# Patient Record
Sex: FEMALE | Race: WHITE | NOT HISPANIC OR LATINO | Employment: FULL TIME | URBAN - METROPOLITAN AREA
[De-identification: names, ages, dates, MRNs, and addresses within clinical notes are randomized per-mention and may not be internally consistent; named-entity substitution may affect disease eponyms.]

---

## 2018-11-28 ENCOUNTER — OCCUPATIONAL HEALTH (OUTPATIENT)
Dept: URGENT CARE | Facility: CLINIC | Age: 48
End: 2018-11-28

## 2018-11-28 PROCEDURE — 71045 X-RAY EXAM CHEST 1 VIEW: CPT | Mod: S$GLB,,, | Performed by: EMERGENCY MEDICINE

## 2018-11-28 PROCEDURE — 80305 DRUG TEST PRSMV DIR OPT OBS: CPT | Mod: S$GLB,,, | Performed by: EMERGENCY MEDICINE

## 2019-03-06 DIAGNOSIS — N60.19: Primary | ICD-10-CM

## 2021-04-27 ENCOUNTER — CLINICAL SUPPORT (OUTPATIENT)
Dept: OTHER | Facility: CLINIC | Age: 51
End: 2021-04-27

## 2021-04-27 DIAGNOSIS — Z00.8 ENCOUNTER FOR OTHER GENERAL EXAMINATION: ICD-10-CM

## 2021-04-28 VITALS — BODY MASS INDEX: 17.85 KG/M2 | HEIGHT: 67 IN

## 2021-04-28 LAB
GLUCOSE SERPL-MCNC: 95 MG/DL (ref 60–140)
HDLC SERPL-MCNC: 101 MG/DL
POC CHOLESTEROL, TOTAL: 181 MG/DL
TRIGL SERPL-MCNC: 80 MG/DL

## 2022-05-31 ENCOUNTER — TELEPHONE (OUTPATIENT)
Dept: NEUROLOGY | Facility: CLINIC | Age: 52
End: 2022-05-31
Payer: COMMERCIAL

## 2022-05-31 NOTE — TELEPHONE ENCOUNTER
----- Message from Ramya Colindres sent at 5/31/2022  8:17 AM CDT -----  Contact: IVONNE BRADFORD [[id number]] [phone]  Type: Appointment Request    Name of Caller: IVONNE BRADFORD [[id number]]  When is the first available appointment? Do not have access  Reason for Visit:  Chronic migraine headaches  Best Call Back Number: [phone]  Additional Information:  New patient, has [employer] insurance.

## 2022-06-09 ENCOUNTER — TELEPHONE (OUTPATIENT)
Dept: NEUROLOGY | Facility: CLINIC | Age: 52
End: 2022-06-09
Payer: COMMERCIAL

## 2022-06-09 NOTE — TELEPHONE ENCOUNTER
Received message about scheduling patient. Attempted to contact, no answer, left voice mail. Will also send message in My Chart.

## 2022-06-10 ENCOUNTER — TELEPHONE (OUTPATIENT)
Dept: NEUROLOGY | Facility: CLINIC | Age: 52
End: 2022-06-10
Payer: COMMERCIAL

## 2022-06-10 NOTE — TELEPHONE ENCOUNTER
Called patient and scheduled new patient appointment. Date/Time/Location confirmed. Verbalized understanding. Also placed on wait list.

## 2022-06-10 NOTE — TELEPHONE ENCOUNTER
----- Message from Germán Camacho sent at 6/10/2022  8:36 AM CDT -----  Regarding: ret call  Type:  Patient Returning Call    Who Called:  Jan    Who Left Message for Patient:  Gabriela    Does the patient know what this is regarding?:  yes    Best Call Back Number:  [phone]      Additional Information:

## 2022-06-22 ENCOUNTER — TELEPHONE (OUTPATIENT)
Dept: NEUROLOGY | Facility: CLINIC | Age: 52
End: 2022-06-22
Payer: COMMERCIAL

## 2022-06-22 NOTE — TELEPHONE ENCOUNTER
Called patient and offered new patient appointment for tomorrow. Patient stated that she had to check with co workers and requested that I call her back.       Called patient back and she will take appointment for tomorrow. Date/Time/Location confirmed. Verbalized understanding.

## 2022-06-23 ENCOUNTER — OFFICE VISIT (OUTPATIENT)
Dept: NEUROLOGY | Facility: CLINIC | Age: 52
End: 2022-06-23
Payer: COMMERCIAL

## 2022-06-23 VITALS
RESPIRATION RATE: 17 BRPM | BODY MASS INDEX: 17.81 KG/M2 | SYSTOLIC BLOOD PRESSURE: 143 MMHG | DIASTOLIC BLOOD PRESSURE: 85 MMHG | WEIGHT: 113.44 LBS | HEART RATE: 86 BPM | HEIGHT: 67 IN

## 2022-06-23 DIAGNOSIS — E03.9 ADULT HYPOTHYROIDISM: ICD-10-CM

## 2022-06-23 DIAGNOSIS — G43.119 INTRACTABLE MIGRAINE WITH AURA WITHOUT STATUS MIGRAINOSUS: Primary | ICD-10-CM

## 2022-06-23 DIAGNOSIS — R51.9 WORSENING HEADACHES: ICD-10-CM

## 2022-06-23 PROCEDURE — 3077F SYST BP >= 140 MM HG: CPT | Mod: CPTII,S$GLB,, | Performed by: PSYCHIATRY & NEUROLOGY

## 2022-06-23 PROCEDURE — 1160F RVW MEDS BY RX/DR IN RCRD: CPT | Mod: CPTII,S$GLB,, | Performed by: PSYCHIATRY & NEUROLOGY

## 2022-06-23 PROCEDURE — 1159F PR MEDICATION LIST DOCUMENTED IN MEDICAL RECORD: ICD-10-PCS | Mod: CPTII,S$GLB,, | Performed by: PSYCHIATRY & NEUROLOGY

## 2022-06-23 PROCEDURE — 1160F PR REVIEW ALL MEDS BY PRESCRIBER/CLIN PHARMACIST DOCUMENTED: ICD-10-PCS | Mod: CPTII,S$GLB,, | Performed by: PSYCHIATRY & NEUROLOGY

## 2022-06-23 PROCEDURE — 3008F BODY MASS INDEX DOCD: CPT | Mod: CPTII,S$GLB,, | Performed by: PSYCHIATRY & NEUROLOGY

## 2022-06-23 PROCEDURE — 99205 OFFICE O/P NEW HI 60 MIN: CPT | Mod: S$GLB,,, | Performed by: PSYCHIATRY & NEUROLOGY

## 2022-06-23 PROCEDURE — 1159F MED LIST DOCD IN RCRD: CPT | Mod: CPTII,S$GLB,, | Performed by: PSYCHIATRY & NEUROLOGY

## 2022-06-23 PROCEDURE — 3008F PR BODY MASS INDEX (BMI) DOCUMENTED: ICD-10-PCS | Mod: CPTII,S$GLB,, | Performed by: PSYCHIATRY & NEUROLOGY

## 2022-06-23 PROCEDURE — 3079F PR MOST RECENT DIASTOLIC BLOOD PRESSURE 80-89 MM HG: ICD-10-PCS | Mod: CPTII,S$GLB,, | Performed by: PSYCHIATRY & NEUROLOGY

## 2022-06-23 PROCEDURE — 99999 PR PBB SHADOW E&M-EST. PATIENT-LVL IV: CPT | Mod: PBBFAC,,, | Performed by: PSYCHIATRY & NEUROLOGY

## 2022-06-23 PROCEDURE — 3077F PR MOST RECENT SYSTOLIC BLOOD PRESSURE >= 140 MM HG: ICD-10-PCS | Mod: CPTII,S$GLB,, | Performed by: PSYCHIATRY & NEUROLOGY

## 2022-06-23 PROCEDURE — 3079F DIAST BP 80-89 MM HG: CPT | Mod: CPTII,S$GLB,, | Performed by: PSYCHIATRY & NEUROLOGY

## 2022-06-23 PROCEDURE — 99999 PR PBB SHADOW E&M-EST. PATIENT-LVL IV: ICD-10-PCS | Mod: PBBFAC,,, | Performed by: PSYCHIATRY & NEUROLOGY

## 2022-06-23 PROCEDURE — 99205 PR OFFICE/OUTPT VISIT, NEW, LEVL V, 60-74 MIN: ICD-10-PCS | Mod: S$GLB,,, | Performed by: PSYCHIATRY & NEUROLOGY

## 2022-06-23 RX ORDER — SUMATRIPTAN 50 MG/1
50 TABLET, FILM COATED ORAL
Qty: 9 TABLET | Refills: 11 | Status: SHIPPED | OUTPATIENT
Start: 2022-06-23 | End: 2022-07-23

## 2022-06-23 RX ORDER — UBROGEPANT 100 MG/1
100 TABLET ORAL ONCE AS NEEDED
Qty: 16 TABLET | Refills: 2 | Status: SHIPPED | OUTPATIENT
Start: 2022-06-23 | End: 2022-06-29

## 2022-06-23 RX ORDER — LAMOTRIGINE 25 MG/1
TABLET ORAL
Qty: 90 TABLET | Refills: 11 | Status: SHIPPED | OUTPATIENT
Start: 2022-06-23 | End: 2022-09-14 | Stop reason: ALTCHOICE

## 2022-06-23 NOTE — PROGRESS NOTES
Headache questionnaire    1. When did your Headaches start?    1994 - AGE 24      2. Where are your headaches located?   TEMPLES AND BASE OF SKULL      3. Your headache's characteristics:   Excruciating, Throbbing, Pounding, Stabbing,     4. How long does the headache last?   HOURS TO DAYS      5. How often does the headache occur?   weekly      6. Are your headaches preceded or accompanied by other symptoms? yes   If yes, please describe.  VISUAL IMPAIRMENT, SPEECH & COGNITIVE IMPAIRMENT      7. Does the headache awaken you at night? no   If so, how often? NA        8. Please lillian the word that best describes your headache's intensity:    severe      9. Using a scale of 1 through 10, with 0 = no pain and 10 = the worst pain:   What score is your headache now? 0   What score is your headache at its worst? 10   What score is your headache at its best? 5        10. Possible associated headache symptoms:  [x]  Sensitivity to light  [x] Dizziness  [] Nasal or sinus pressure/ pain   [x] Sensitivity to noise  [] Vertigo  [] Problems with concentration  [x] Sensitivity to smells  [] Ringing in ears  [x] Problems with memory    [x] Blurred vision SPOTS  [] Irritability  [] Problems with task completion   [] Double vision  [] Anger  []  Problems with relaxation  [] Loss of appetite  [] Anxiety  [x] Neck tightness, Neck pain  [] Nausea   [] Nasal congestion  [] Vomiting         11. Headache improving factors:  [] Sleep  [] Heat  [x] Darkness  [] Ice  [x] Local pressure [] Menses (period)  [] Massage   [x] Medications:        12. Headache worsening factors:   [] Fatigue [] Sneezing  [] Changes in Weather  [x] Light [x] Bending Over [x] Stress  [] Noise [] Ovulation  [] Multiple Sclerosis Flare-Up  [x] Smells  [] Menses  [] Food   [] Coughing [] Alcohol      13. Number of caffeinated drinks per day: 1      14. Number of diet drinks per day:  0      15. Have you seen any other [facility] within the last 3 years?   no      Please Check any Medications or Procedures tried/failed for Headache    AED Neuromodulators  MAOIs  Ergot Alkaloids    Acetazolamide (Diamox) [] Phenelzine (Nardil) [] Dihydroergotamine (Migranal) []   Carbamazepine (Tegretol) [] Tranylcypromine (Parnate) [] Ergotamine (Ergomar) []   Gabapentin (Neurontin) [] Antihistamine/Serotonergic  Triptans    Lacosamide (Vimpat) [] Cyproheptadine (Periactin) [] Almotriptan (Axert) []   Lamotrigine (Lamictal) [] Antihypertensives  Eletriptan (Relpax) []   Levatiracetam (Keppra) [] Atenolol (Tenormin) [] Frovatriptan (Frova) []   Oxcarbazepine (Trileptal) [] Bisoprostol (Zebeta) [] Naratriptan (Amerge) []   Phenobarbital [] Candesartan (Atacand) [] Rizatriptan (Maxalt) []     Nebivolol (Bystolic)  Sumatriptan (Imitrex) [x]   Levetiracetam (Keppra)  Cardeilol (Coreg) [] Zolmitriptan (Zomig) []   Phenytoin (Dilantin) [] Diltiazem (Cardizem) []     Pregabalin (Lyrica) [] Lisinopril (Prinivil, Zestril) [] Combo Abortives    Primidone (Mysoline) [] Metoprolol (Toprol) [] BC Powder []   Tiagabine (Gabatril) [] Nadolol (Corgard) [] Butalbital and Acetaminophen (Bupap) []   Topiramate (Topamax)  (Trokendi) [x] Nicardipine (Cardene) []     Vigabatrin (Sabril) [] Nimodipine (Nimotop) [] Butalbital, Acetaminophen, and caffeine (Fioricet) []   Valproic Acid (Depakote) (Divalproex Sodium) [] Propranolol (Inderal) []     Zonisamide (Zonegran) [] Telmisartan (Micardis) [] Butalbital, Aspirin, and caffeine (Fiorinal) []   Benzodiazepines  Timolol (Blocadren) []     Alprazolam (Xanax) [] Verapamil (Calan, Verelan) [] Butalbital, Caffeine, Acetaminophen, and Codeine (Fioricet with Codeine) []   Diazepam (Valium) [] NSAIDs      Lorazepam (Ativan) [x] Acetaminophen (Tylenol) []     Clonazepam (Klonopin) [] Acetylsalicylic Acid (Aspirin) [] Butalbital, Caffeine, Aspirin, and Codeine  (Fiorinal with Codeine) []   Antidepressants  Diclofenac (Cambia) []     Amitriptyline (Elavil) [] Ibuprofen  (Motrin) []     Desipramine (Norpramin) [] Indomethacin (Indocin) [] Aspirin, Caffeine, and Acetaminophen (Excedrin) (Goodys) []   Doxepin (Sinequan) [] Ketoprofen (Orudis) []     Fluoxetine (Prozac) [] Ketorolac (Toradol) [] Acetaminophen, Dichloralphenazone, and Isometheptene (Midrin) []   Imipramine (Tofranil) [] Naproxen (Anaprox) (Aleve) [x]     Nortriptyline (Pamelor) [] Meclofenamic Acid (Meclomen) []     Venlafaxine (Effexor) [] Meloxicam (Mobic) [] Procedures    Desvenlafazine (Pristiq) [] Monoclonals  Greater occipital nerve block []   Duloxetine (Cymbalta) [] Eptinezumab [] Cervical, Thoracic, Lumbar radiofrequency ablation []   Trazadone [] Erenumab-aooe (Aimovig) [x] Spenopalatine ganglion block []   Wellbutrin [] Galcanezumab (Emgality) [] Occipital neuro stimulation []   Protriptyline (Vivactil) [] Fremanazumab-vfrm (Ajovy) X Cervical, Thoracic, Lumbar, Caudal Epidural steroid injection []   Escitalopram (Lexapro) [] Other [] Sacroiliac joint steroid injection []   Celexa [] Memantine (Namenda) [] Transforaminal epidural steroid injection []   NURTEC X Botox [] Facet joint injections []     Baclofen (Lioresal) [] Cervical, Thoracic, Lumbar medial branch blocks []       Cefaly []       Gamma Core []       Iovera []       Transcranial Magnetic stimulation []

## 2022-06-23 NOTE — PROGRESS NOTES
Subjective:       Patient ID: Jan Rosario is a 52 y.o. female.    Chief Complaint: Headache    HPI   The patient is a pleasant 53 y/o female presenting with chief complaint of headache. PMHx of sleep difficulty and hypothyroidism. She has long history of migraine starting at the age of 24. She reports episodic attacks with remission during her 3 pregnancies. No previous history of menstrual worsening or OCP other than from age 24 until age 27. Not on hormonal replacement. She describes a change in her habitual pattern. Her headaches used to be extremely painful but now, it is the visual aura that is more of a problem along with severe photophobia. In the past she had tried Topamax, more recently, her PCP started her on AImovig 70 mg, when it lost efficacy, she was referred to Neurology. Aimovig increased to 140 mg. She experienced wearing off effect, duration of effect 3 weeks instead of 4 weeks. She was switched to Ajovy but it does not seem to be working well. All of her attacks are preceded by visual aura describes as scintillating scotomata, impaired speech and cognitive impairment, lasting 30 minutes to one hour. Since on Nurtec, aura lasted 40 minutes on Saturday and 30 minutes last Sunday. She also uses Sumatriptan 50 mg wit 2 Aleves for acute attacks. Triggers include glare from driving and computer use. Please see details of headache characteristics below.    Headache questionnaire    1. When did your Headaches start?    1994 - AGE 24      2. Where are your headaches located?   TEMPLES AND BASE OF SKULL      3. Your headache's characteristics:   Excruciating, Throbbing, Pounding, Stabbing,     4. How long does the headache last?   HOURS TO DAYS      5. How often does the headache occur?   weekly      6. Are your headaches preceded or accompanied by other symptoms? yes   If yes, please describe.  VISUAL IMPAIRMENT, SPEECH & COGNITIVE IMPAIRMENT      7. Does the headache awaken you at night? no   If so,  how often? NA        8. Please lillian the word that best describes your headache's intensity:    severe      9. Using a scale of 1 through 10, with 0 = no pain and 10 = the worst pain:   What score is your headache now? 0   What score is your headache at its worst? 10   What score is your headache at its best? 5        10. Possible associated headache symptoms:  [x]  Sensitivity to light  [x] Dizziness  [] Nasal or sinus pressure/ pain   [x] Sensitivity to noise  [] Vertigo  [] Problems with concentration  [x] Sensitivity to smells  [] Ringing in ears  [x] Problems with memory    [x] Blurred vision SPOTS  [] Irritability  [] Problems with task completion   [] Double vision  [] Anger  []  Problems with relaxation  [] Loss of appetite  [] Anxiety  [x] Neck tightness, Neck pain  [] Nausea   [] Nasal congestion  [] Vomiting         11. Headache improving factors:  [] Sleep  [] Heat  [x] Darkness  [] Ice  [x] Local pressure [] Menses (period)  [] Massage   [x] Medications:        12. Headache worsening factors:   [] Fatigue [] Sneezing  [] Changes in Weather  [x] Light [x] Bending Over [x] Stress  [] Noise [] Ovulation  [] Multiple Sclerosis Flare-Up  [x] Smells  [] Menses  [] Food   [] Coughing [] Alcohol      13. Number of caffeinated drinks per day: 1      14. Number of diet drinks per day:  0    Please Check any Medications or Procedures tried/failed for Headache    AED Neuromodulators  MAOIs  Ergot Alkaloids    Acetazolamide (Diamox) [] Phenelzine (Nardil) [] Dihydroergotamine (Migranal) []   Carbamazepine (Tegretol) [] Tranylcypromine (Parnate) [] Ergotamine (Ergomar) []   Gabapentin (Neurontin) [] Antihistamine/Serotonergic  Triptans    Lacosamide (Vimpat) [] Cyproheptadine (Periactin) [] Almotriptan (Axert) []   Lamotrigine (Lamictal) [] Antihypertensives  Eletriptan (Relpax) []   Levatiracetam (Keppra) [] Atenolol (Tenormin) [] Frovatriptan (Frova) []   Oxcarbazepine (Trileptal) [] Bisoprostol (Zebeta) []  Naratriptan (Amerge) []   Phenobarbital [] Candesartan (Atacand) [] Rizatriptan (Maxalt) []     Nebivolol (Bystolic)  Sumatriptan (Imitrex) [x]   Levetiracetam (Keppra)  Cardeilol (Coreg) [] Zolmitriptan (Zomig) []   Phenytoin (Dilantin) [] Diltiazem (Cardizem) []     Pregabalin (Lyrica) [] Lisinopril (Prinivil, Zestril) [] Combo Abortives    Primidone (Mysoline) [] Metoprolol (Toprol) [] BC Powder []   Tiagabine (Gabatril) [] Nadolol (Corgard) [] Butalbital and Acetaminophen (Bupap) []   Topiramate (Topamax)  (Trokendi) [x] Nicardipine (Cardene) []     Vigabatrin (Sabril) [] Nimodipine (Nimotop) [] Butalbital, Acetaminophen, and caffeine (Fioricet) []   Valproic Acid (Depakote) (Divalproex Sodium) [] Propranolol (Inderal) []     Zonisamide (Zonegran) [] Telmisartan (Micardis) [] Butalbital, Aspirin, and caffeine (Fiorinal) []   Benzodiazepines  Timolol (Blocadren) []     Alprazolam (Xanax) [] Verapamil (Calan, Verelan) [] Butalbital, Caffeine, Acetaminophen, and Codeine (Fioricet with Codeine) []   Diazepam (Valium) [] NSAIDs      Lorazepam (Ativan) [x] Acetaminophen (Tylenol) []     Clonazepam (Klonopin) [] Acetylsalicylic Acid (Aspirin) [] Butalbital, Caffeine, Aspirin, and Codeine  (Fiorinal with Codeine) []   Antidepressants  Diclofenac (Cambia) []     Amitriptyline (Elavil) [] Ibuprofen (Motrin) []     Desipramine (Norpramin) [] Indomethacin (Indocin) [] Aspirin, Caffeine, and Acetaminophen (Excedrin) (Goodys) []   Doxepin (Sinequan) [] Ketoprofen (Orudis) []     Fluoxetine (Prozac) [] Ketorolac (Toradol) [] Acetaminophen, Dichloralphenazone, and Isometheptene (Midrin) []   Imipramine (Tofranil) [] Naproxen (Anaprox) (Aleve) [x]     Nortriptyline (Pamelor) [] Meclofenamic Acid (Meclomen) []     Venlafaxine (Effexor) [] Meloxicam (Mobic) [] Procedures    Desvenlafazine (Pristiq) [] Monoclonals  Greater occipital nerve block []   Duloxetine (Cymbalta) [] Eptinezumab [] Cervical, Thoracic, Lumbar radiofrequency  ablation []   Trazadone [] Erenumab-aooe (Aimovig) [x] Spenopalatine ganglion block []   Wellbutrin [] Galcanezumab (Emgality) [] Occipital neuro stimulation []   Protriptyline (Vivactil) [] Fremanazumab-vfrm (Ajovy) X Cervical, Thoracic, Lumbar, Caudal Epidural steroid injection []   Escitalopram (Lexapro) [] Other [] Sacroiliac joint steroid injection []   Celexa [] Memantine (Namenda) [] Transforaminal epidural steroid injection []   NURTEC X Botox [] Facet joint injections []     Baclofen (Lioresal) [] Cervical, Thoracic, Lumbar medial branch blocks []       Cefaly []       Gamma Core []       Iovera []       Transcranial Magnetic stimulation []                          Review of Systems   Constitutional: Negative for activity change, appetite change, fatigue and fever.   HENT: Negative for congestion, dental problem, hearing loss, sinus pressure, tinnitus, trouble swallowing and voice change.    Eyes: Positive for visual disturbance. Negative for photophobia, pain and redness.   Respiratory: Negative for cough, chest tightness and shortness of breath.    Cardiovascular: Negative for chest pain, palpitations and leg swelling.   Gastrointestinal: Negative for abdominal pain, blood in stool, nausea and vomiting.   Endocrine: Negative for cold intolerance and heat intolerance.   Genitourinary: Negative for difficulty urinating, frequency, menstrual problem and urgency.   Musculoskeletal: Negative for arthralgias, back pain, gait problem, joint swelling, myalgias, neck pain and neck stiffness.   Skin: Negative.    Neurological: Negative for dizziness, tremors, seizures, syncope, facial asymmetry, speech difficulty, weakness, light-headedness, numbness and headaches.   Hematological: Negative for adenopathy. Does not bruise/bleed easily.   Psychiatric/Behavioral: Negative for agitation, behavioral problems, confusion, decreased concentration, self-injury, sleep disturbance and suicidal ideas. The patient is not  nervous/anxious and is not hyperactive.            Past Medical History:   Diagnosis Date    Allergy     Breast mass     History of tuberculosis     Hypothyroidism     Insomnia     Migraines      Past Surgical History:   Procedure Laterality Date    APPENDECTOMY      BUNIONECTOMY      lymphectomy      MOUTH SURGERY      X2    TONSILLECTOMY      WISDOM TOOTH EXTRACTION       Family History   Problem Relation Age of Onset    Hypertension Mother     No Known Problems Father     Bipolar disorder Sister     No Known Problems Brother     No Known Problems Daughter     No Known Problems Son     No Known Problems Son      Social History     Socioeconomic History    Marital status:    Tobacco Use    Smoking status: Current Some Day Smoker     Types: Cigarettes    Smokeless tobacco: Never Used   Substance and Sexual Activity    Alcohol use: Yes     Comment: Occasionally    Drug use: No     Review of patient's allergies indicates:   Allergen Reactions    Penicillin g potassium Swelling    Latex Rash       Current Outpatient Medications:     LORazepam (ATIVAN) 1 MG tablet, Take 0.5 tablets (0.5 mg total) by mouth every 12 (twelve) hours as needed for Anxiety (severe anxiety)., Disp: 10 tablet, Rfl: 0    ondansetron (ZOFRAN-ODT) 4 MG TbDL, DISSOLVE ONE TABLET BY MOUTH EVERY 12 HOURS AS NEEDED FOR NAUSEA DUE TO MIGRAINE, Disp: 12 tablet, Rfl: 0    SYNTHROID 137 mcg Tab tablet, Take 1 tablet (137 mcg total) by mouth before breakfast. (Patient taking differently: 125 mcg.), Disp: 90 tablet, Rfl: 2    lamoTRIgine (LAMICTAL) 25 MG tablet, Take 1 po daily for 2 wks, then 2 po daily for 2 wks, then 3 po daily (initial goal), Disp: 90 tablet, Rfl: 11    sumatriptan (IMITREX) 100 MG tablet, TAKE ONE TABLET BY MOUTH AS NEEDED FOR HEADACHE (Patient not taking: Reported on 6/23/2022), Disp: 6 tablet, Rfl: 0    sumatriptan (IMITREX) 50 MG tablet, Take 1 tablet (50 mg total) by mouth every 2 (two) hours  as needed for Migraine., Disp: 9 tablet, Rfl: 11    topiramate (TOPAMAX) 25 MG tablet, Take 1 tablet (25 mg total) by mouth every evening., Disp: 90 tablet, Rfl: 2    ubrogepant (UBROGEPANT) 100 mg tablet, Take 1 tablet (100 mg total) by mouth once as needed for Migraine., Disp: 16 tablet, Rfl: 2      Objective:      Vitals:    06/23/22 0947   BP: (!) 143/85   Pulse: 86   Resp: 17     Body mass index is 17.77 kg/m².    Physical Exam    Constitutional:   She appears well-developed and well-nourished. She is well groomed. Leptosomic body habit    HENT:    Head: Atraumatic, oral and nasal mucosa intact  Eyes: Conjunctivae and EOM are normal. Pupils are equal, round, and reactive to light OU  Neck: Neck supple. No thyromegaly present  Cardiovascular: Normal rate and normal heart sounds  No murmur heard  Pulmonary/Chest: Effort normal and breath sounds normal  Musculoskeletal: Normal range of motion. No joint stiffness. No vertebral point tenderness  Skin: Skin is warm and dry  Psychiatric: Normal mood and affect     Neuro exam:    Mental status:  Awake, attentive, Alert, oriented to self, place, year and month  Language function is intact    Cranial Nerves:  Smell was not formally evaluated  Cranial Nerves II - XII: intact  Pursuits were smooth, normal saccades, no nystagmus OU  Funduscopic exam - disc were flat and pink, no exudates or hemorrhages OU  Motor - facial movement was symmetrical and normal     Palate moved well and was symmetrical with normal palatal and oral sensation  Tongue movements were full    Coordination:     Rapid alternating movements and rapid finger tapping - normal bilaterally  Finger to nose - normal and symmetric bilaterally   Heel to shin test - normal and symmetric bilaterally   Arm roll - smooth and symmetric   No intentional or positional tremor.     Motor:  Normal muscle bulk and symmetry. No fasciculations were noted    No pronator drift  Strength  5/5 bilaterally      Reflexes:  Tendon reflexes were 2 + at biceps, triceps, brachioradialis, patellar, and Achilles bilaterally  On plantar stimulation toes were down going bilaterally  No clonus was noted     Sensory: Intact to primary modalities in all extremities. Vibration and proprioception intact in all extremities.     Gait: Romberg absent. Normal gait. Normal arm swing and turns. Normal postural reflexes.     Review of Data:  Results for orders placed or performed during the hospital encounter of 04/24/20   CT Head Without Contrast    Narrative    EXAMINATION:  CT HEAD WITHOUT CONTRAST    CPT: 23965    CLINICAL HISTORY:  Headache, chronic, new features or neuro deficit;.    TECHNIQUE:  Axial CT slices through the head were obtained without the administration of contrast.  Sagittal and coronal reconstructions were performed.  Automated exposure control was utilized.  Total DLP is approximately 820.64 mGy cm.    COMPARISON:  None.    FINDINGS:  Mild generalized involutional changes are seen.  No evidence of acute intracranial hemorrhage, mass effect, midline deviation, hydrocephalus, or abnormal extra-axial fluid collection is visualized.  No evidence of acute large vessel territory ischemia/infarction is appreciated.  MRI with diffusion-weighted imaging is more sensitive in the assessment of acute ischemia/infarction.  The basilar cisterns are preserved. The visualized paranasal sinuses and left mastoid air cells appear to be grossly clear.  Trace right mastoid effusion is noted.  No acute displaced calvarial fracture is visualized.      Impression    1. No acute intracranial abnormality is visualized.      Electronically signed by: Jose Olson MD  Date:    04/24/2020  Time:    14:00               Assessment and Plan   Intractable migraine with aura without status migrainosus. Given the fact that the patient experiences the visual aura with all attacks, I would like to screen for PFO or ASD. Lamotrigine is a well tolerated  drug with a specific indication for Migraine with aura. I will start with a low dose to find the minimal effective dose. For acute attack, continue Imitrex with Aleve and start Ubrelvy 100 mg as an alternative or in combination for severe headaches.   -     Echo Saline Bubble Yes; Future  -     lamoTRIgine (LAMICTAL) 25 MG tablet; Take 1 po daily for 2 wks, then 2 po daily for 2 wks, then 3 po daily (initial goal)  Dispense: 90 tablet; Refill: 11    Worsening headaches  -     MRI Brain W WO Contrast; Future; Expected date: 06/23/2022    Other orders  -     ubrogepant (UBROGEPANT) 100 mg tablet; Take 1 tablet (100 mg total) by mouth once as needed for Migraine.  Dispense: 16 tablet; Refill: 2  -     sumatriptan (IMITREX) 50 MG tablet; Take 1 tablet (50 mg total) by mouth every 2 (two) hours as needed for Migraine.  Dispense: 9 tablet; Refill: 11        I have discussed the side effects of the medications prescribed and the patient acknowledges understanding    I spent 60 minutes on the day of this encounter preparing, treating, and managing this patient with intractable migraine headache with aura.      Karime Woodall M.D  Medical Director, Headache and Facial Pain  [facility]

## 2022-06-23 NOTE — PATIENT INSTRUCTIONS
Recommendations:  Antiglare screen for computer  Stop Aimovig  Start Magnesium Oxide 400 mg BID  Start Lamotrigine 25 mg for 2 wks, 50 mg christopher 1 wk, then 75 mg (initial goal dose)  Start Ubrelvy 100 mg po prn for acute attacks, may repeat once to a max of 200 mg  Continue Sumatriptan 50 mg as needed  May combine Sumatriptan with Ubrelvy and Aleve for severe attacks

## 2022-06-24 ENCOUNTER — TELEPHONE (OUTPATIENT)
Dept: PHARMACY | Facility: CLINIC | Age: 52
End: 2022-06-24
Payer: COMMERCIAL

## 2022-06-24 NOTE — TELEPHONE ENCOUNTER
Ubrelvy prescription has been APPROVED FROM 6/24/2022 TO 12/31/2022 with copayment of $0.       [facility] @[phone] will reach out to patient for further correspondence.

## 2022-07-12 DIAGNOSIS — R51.9 WORSENING HEADACHES: Primary | ICD-10-CM

## 2022-08-22 ENCOUNTER — OFFICE VISIT (OUTPATIENT)
Dept: NEUROLOGY | Facility: CLINIC | Age: 52
End: 2022-08-22
Payer: COMMERCIAL

## 2022-08-22 VITALS
HEIGHT: 66 IN | DIASTOLIC BLOOD PRESSURE: 79 MMHG | RESPIRATION RATE: 17 BRPM | HEART RATE: 75 BPM | SYSTOLIC BLOOD PRESSURE: 128 MMHG | BODY MASS INDEX: 18.18 KG/M2 | WEIGHT: 113.13 LBS

## 2022-08-22 DIAGNOSIS — E03.9 ADULT HYPOTHYROIDISM: Primary | ICD-10-CM

## 2022-08-22 DIAGNOSIS — G43.119 INTRACTABLE MIGRAINE WITH AURA WITHOUT STATUS MIGRAINOSUS: ICD-10-CM

## 2022-08-22 PROCEDURE — 1160F RVW MEDS BY RX/DR IN RCRD: CPT | Mod: CPTII,S$GLB,, | Performed by: PSYCHIATRY & NEUROLOGY

## 2022-08-22 PROCEDURE — 99999 PR PBB SHADOW E&M-EST. PATIENT-LVL III: ICD-10-PCS | Mod: PBBFAC,,, | Performed by: PSYCHIATRY & NEUROLOGY

## 2022-08-22 PROCEDURE — 3078F PR MOST RECENT DIASTOLIC BLOOD PRESSURE < 80 MM HG: ICD-10-PCS | Mod: CPTII,S$GLB,, | Performed by: PSYCHIATRY & NEUROLOGY

## 2022-08-22 PROCEDURE — 1160F PR REVIEW ALL MEDS BY PRESCRIBER/CLIN PHARMACIST DOCUMENTED: ICD-10-PCS | Mod: CPTII,S$GLB,, | Performed by: PSYCHIATRY & NEUROLOGY

## 2022-08-22 PROCEDURE — 3008F PR BODY MASS INDEX (BMI) DOCUMENTED: ICD-10-PCS | Mod: CPTII,S$GLB,, | Performed by: PSYCHIATRY & NEUROLOGY

## 2022-08-22 PROCEDURE — 99999 PR PBB SHADOW E&M-EST. PATIENT-LVL III: CPT | Mod: PBBFAC,,, | Performed by: PSYCHIATRY & NEUROLOGY

## 2022-08-22 PROCEDURE — 3008F BODY MASS INDEX DOCD: CPT | Mod: CPTII,S$GLB,, | Performed by: PSYCHIATRY & NEUROLOGY

## 2022-08-22 PROCEDURE — 3074F SYST BP LT 130 MM HG: CPT | Mod: CPTII,S$GLB,, | Performed by: PSYCHIATRY & NEUROLOGY

## 2022-08-22 PROCEDURE — 3074F PR MOST RECENT SYSTOLIC BLOOD PRESSURE < 130 MM HG: ICD-10-PCS | Mod: CPTII,S$GLB,, | Performed by: PSYCHIATRY & NEUROLOGY

## 2022-08-22 PROCEDURE — 3078F DIAST BP <80 MM HG: CPT | Mod: CPTII,S$GLB,, | Performed by: PSYCHIATRY & NEUROLOGY

## 2022-08-22 PROCEDURE — 1159F MED LIST DOCD IN RCRD: CPT | Mod: CPTII,S$GLB,, | Performed by: PSYCHIATRY & NEUROLOGY

## 2022-08-22 PROCEDURE — 1159F PR MEDICATION LIST DOCUMENTED IN MEDICAL RECORD: ICD-10-PCS | Mod: CPTII,S$GLB,, | Performed by: PSYCHIATRY & NEUROLOGY

## 2022-08-22 PROCEDURE — 99214 OFFICE O/P EST MOD 30 MIN: CPT | Mod: S$GLB,,, | Performed by: PSYCHIATRY & NEUROLOGY

## 2022-08-22 PROCEDURE — 99214 PR OFFICE/OUTPT VISIT, EST, LEVL IV, 30-39 MIN: ICD-10-PCS | Mod: S$GLB,,, | Performed by: PSYCHIATRY & NEUROLOGY

## 2022-08-22 RX ORDER — UBROGEPANT 100 MG/1
100 TABLET ORAL ONCE AS NEEDED
COMMUNITY

## 2022-08-22 NOTE — PROGRESS NOTES
[facility] - HEADACHE  [facility], [place]    Date: 8/22/22  Patient Name: Jan Rosario   MRN: [id number]   PCP: Lea Aguillon  Referring Provider: No ref. provider found    Assessment:   Jan Rosario is a 52 y.o. female presenting for auras follow up. She has been tolerating the lamotrigine 75 mg qd well, no evident side effects. She has not had another visual aura for the past 6 weeks since it was started. She has not had any headaches so its not taking any rescue medications at this point. She is very pleased with the results. We will plan a follow up in 3-4 months and if at that time she is still doing better, we will consider lowering lamotrigine dose. Patient verbalized understanding and agreed with the plan     Plan:     - Continue lamotrigine 75 mg qd  - Follow up in 3-4 months  I have personally seen and evaluated this patient. I have confirmed key portions of the history and examination. I concurred with the residents findings and documentation      Karime Woodall M.D  Medical Director, Headache and Facial Pain  [facility]      Problem List Items Addressed This Visit        Neuro    Intractable migraine with aura without status migrainosus        Asa Collazo MD    Subjective:   Patient seen in consultation at the request of No ref. provider found for the evaluation of migraines with aura . A copy of this note will be sent to the referring physician.     Interval history 08/22/2022:  presenting for migraines with aura follow up. She has not had a headache since 1.5 years ago. On last appointment she was started on lamotrigine for aura. She has been tolerating the lamotrigine 75 mg qd well, no evident side effects. She has not had another visual aura for the past 6 weeks since it was started. She has not had any headaches so its not taking any rescue medications at this point. She is very pleased with the results.      HPI 06/23/2022:   The patient is  a pleasant 53 y/o female presenting with chief complaint of headache. PMHx of sleep difficulty and hypothyroidism. She has long history of migraine starting at the age of 24. She reports episodic attacks with remission during her 3 pregnancies. No previous history of menstrual worsening or OCP other than from age 24 until age 27. Not on hormonal replacement. She describes a change in her habitual pattern. Her headaches used to be extremely painful but now, it is the visual aura that is more of a problem along with severe photophobia. In the past she had tried Topamax, more recently, her PCP started her on AImovig 70 mg, when it lost efficacy, she was referred to Neurology. Aimovig increased to 140 mg. She experienced wearing off effect, duration of effect 3 weeks instead of 4 weeks. She was switched to Ajovy but it does not seem to be working well. All of her attacks are preceded by visual aura describes as scintillating scotomata, impaired speech and cognitive impairment, lasting 30 minutes to one hour. Since on Nurtec, aura lasted 40 minutes on Saturday and 30 minutes last Sunday. She also uses Sumatriptan 50 mg wit 2 Aleves for acute attacks. Triggers include glare from driving and computer use.     PAST MEDICAL HISTORY:  Past Medical History:   Diagnosis Date    Allergy     Breast mass     History of tuberculosis     Hypothyroidism     Insomnia     Migraine headache     Migraines        PAST SURGICAL HISTORY:  Past Surgical History:   Procedure Laterality Date    APPENDECTOMY      BUNIONECTOMY      lymphectomy      MOUTH SURGERY      X2    TONSILLECTOMY      WISDOM TOOTH EXTRACTION         CURRENT MEDS:  Current Outpatient Medications   Medication Sig Dispense Refill    lamoTRIgine (LAMICTAL) 25 MG tablet Take 1 po daily for 2 wks, then 2 po daily for 2 wks, then 3 po daily (initial goal) 90 tablet 11    LORazepam (ATIVAN) 1 MG tablet Take 0.5 tablets (0.5 mg total) by mouth every 12 (twelve)  "hours as needed for Anxiety (severe anxiety). 10 tablet 0    ondansetron (ZOFRAN-ODT) 4 MG TbDL DISSOLVE ONE TABLET BY MOUTH EVERY 12 HOURS AS NEEDED FOR NAUSEA DUE TO MIGRAINE 12 tablet 0    sumatriptan (IMITREX) 100 MG tablet TAKE ONE TABLET BY MOUTH AS NEEDED FOR HEADACHE 6 tablet 0    SYNTHROID 137 mcg Tab tablet Take 1 tablet (137 mcg total) by mouth before breakfast. (Patient taking differently: 125 mcg.) 90 tablet 2    ubrogepant (UBRELVY) 100 mg tablet Take 100 mg by mouth once as needed for Migraine.       No current facility-administered medications for this visit.       ALLERGIES:  Review of patient's allergies indicates:   Allergen Reactions    Penicillin g potassium Swelling    Latex Rash       FAMILY HISTORY:  Family History   Problem Relation Age of Onset    Hypertension Mother     No Known Problems Father     Bipolar disorder Sister     No Known Problems Brother     No Known Problems Daughter     No Known Problems Son     No Known Problems Son        SOCIAL HISTORY:  Social History     Tobacco Use    Smoking status: Former Smoker     Types: Cigarettes    Smokeless tobacco: Never Used   Substance Use Topics    Alcohol use: Yes     Comment: Occasionally    Drug use: No       Review of Systems:  12 system review of systems is negative except for the symptoms mentioned in HPI.      Objective:     Vitals:    08/22/22 0815   BP: 128/79   BP Location: Right arm   Patient Position: Sitting   BP Method: Medium (Automatic)   Pulse: 75   Resp: 17   Weight: 51.3 kg (113 lb 1.5 oz)   Height: 5' 6" (1.676 m)     General: NAD, well nourished   Eyes: no tearing, discharge, no erythema   ENT: moist mucous membranes of the oral cavity, nares patent    Neck: Supple, full range of motion  Cardiovascular: Warm and well perfused, pulses equal and symmetrical  Lungs: Normal work of breathing, normal chest wall excursions  Skin: No rash, lesions, or breakdown on exposed skin  Psychiatry: Mood and affect are " appropriate   Abdomen: soft, non tender, non distended  Extremeties: No cyanosis, clubbing or edema.    Neurological   MENTAL STATUS: Alert and oriented to person, place, and time. Attention and concentration within normal limits. Speech without dysarthria, able to name and repeat without difficulty. Recent and remote memory within normal limits   CRANIAL NERVES: Visual fields intact. PERRL. EOMI. Facial sensation intact. Face symmetrical. Hearing grossly intact. Full shoulder shrug bilaterally. Tongue protrudes midline   SENSORY: Sensation is intact to pin throughout.  Joint position perception intact. Negative Romberg.   MOTOR: Normal bulk and tone. No pronator drift.  5/5 deltoid, biceps, triceps, interosseous, hand  bilaterally. 5/5 iliopsoas, knee extension/flexion, foot dorsi/plantarflexion bilaterally.    REFLEXES: Symmetric and 2+ throughout. Toes down going bilaterally.   CEREBELLAR/COORDINATION/GAIT: Gait steady with normal arm swing and stride length.  Heel to shin intact. Finger to nose intact. Normal rapid alternating movements.

## 2022-09-13 ENCOUNTER — TELEPHONE (OUTPATIENT)
Dept: NEUROLOGY | Facility: CLINIC | Age: 52
End: 2022-09-13
Payer: COMMERCIAL

## 2022-09-13 NOTE — TELEPHONE ENCOUNTER
----- Message from Germán Camacho sent at 9/13/2022 10:13 AM CDT -----  Regarding: ret call  Type:  Patient Returning Call    Who Called: riri    Who Left Message for Patient:  segundo    Does the patient know what this is regarding?:  yes    Best Call Back Number:  [phone]     Additional Information:  pt has clients from 11a to 1:30pm. If cant call before 11 don't till after 1:30pm

## 2022-09-13 NOTE — TELEPHONE ENCOUNTER
----- Message from Vandana Yoo sent at 9/13/2022  8:30 AM CDT -----  Contact: Self  Type: Needs Medical Advice  Who Called:  Patient   Symptoms (please be specific):  Increase in migraine aura with visual disturbance, brain fog, confusion  How long has patient had these symptoms:  started this morning  Pharmacy name and phone #:    [facility] - [facility] - [address]  [address]  [place] [address]  Phone: [phone] Fax: [phone]  Best Call Back Number: [phone]  Additional Information: Please call pt back to advise, stated to leave a message if she doesn't answer. Thank You.

## 2022-09-14 ENCOUNTER — PATIENT MESSAGE (OUTPATIENT)
Dept: NEUROLOGY | Facility: CLINIC | Age: 52
End: 2022-09-14
Payer: COMMERCIAL

## 2022-09-14 ENCOUNTER — TELEPHONE (OUTPATIENT)
Dept: NEUROLOGY | Facility: CLINIC | Age: 52
End: 2022-09-14
Payer: COMMERCIAL

## 2022-09-14 RX ORDER — LAMOTRIGINE 100 MG/1
100 TABLET ORAL DAILY
Qty: 30 TABLET | Refills: 11 | Status: SHIPPED | OUTPATIENT
Start: 2022-09-14 | End: 2023-02-20

## 2022-09-14 NOTE — TELEPHONE ENCOUNTER
----- Message from Germán Camacho sent at 9/14/2022  8:44 AM CDT -----  Regarding: ret call  Type:  Patient Returning Call     Who Called: riri     Who Left Message for Patient:  segundo     Does the patient know what this is regarding?:  yes     Best Call Back Number:  [phone]      Additional Information:  pt states will be open all day as of time of this message.

## 2022-09-14 NOTE — TELEPHONE ENCOUNTER
Spoke with patient, she stated she had a breakthrough migraine with aura.  This has not happened since starting the Lamotrigine 75 mg daily.  She took the rescue Ubrelvy within 2 hrs the migraine subsided.  She mentioned she is concerned she will start having increase in migraines.  She would like to know if it would be beneficial to increase the Lamotrigine.

## 2022-11-21 ENCOUNTER — OFFICE VISIT (OUTPATIENT)
Dept: NEUROLOGY | Facility: CLINIC | Age: 52
End: 2022-11-21
Payer: COMMERCIAL

## 2022-11-21 VITALS
HEART RATE: 75 BPM | BODY MASS INDEX: 18.77 KG/M2 | SYSTOLIC BLOOD PRESSURE: 131 MMHG | RESPIRATION RATE: 18 BRPM | DIASTOLIC BLOOD PRESSURE: 85 MMHG | WEIGHT: 116.31 LBS

## 2022-11-21 DIAGNOSIS — G43.119 INTRACTABLE MIGRAINE WITH AURA WITHOUT STATUS MIGRAINOSUS: Primary | ICD-10-CM

## 2022-11-21 DIAGNOSIS — E03.9 ADULT HYPOTHYROIDISM: ICD-10-CM

## 2022-11-21 PROCEDURE — 3079F PR MOST RECENT DIASTOLIC BLOOD PRESSURE 80-89 MM HG: ICD-10-PCS | Mod: CPTII,S$GLB,, | Performed by: PSYCHIATRY & NEUROLOGY

## 2022-11-21 PROCEDURE — 1159F MED LIST DOCD IN RCRD: CPT | Mod: CPTII,S$GLB,, | Performed by: PSYCHIATRY & NEUROLOGY

## 2022-11-21 PROCEDURE — 99999 PR PBB SHADOW E&M-EST. PATIENT-LVL III: CPT | Mod: PBBFAC,,, | Performed by: PSYCHIATRY & NEUROLOGY

## 2022-11-21 PROCEDURE — 99214 OFFICE O/P EST MOD 30 MIN: CPT | Mod: S$GLB,,, | Performed by: PSYCHIATRY & NEUROLOGY

## 2022-11-21 PROCEDURE — 3075F SYST BP GE 130 - 139MM HG: CPT | Mod: CPTII,S$GLB,, | Performed by: PSYCHIATRY & NEUROLOGY

## 2022-11-21 PROCEDURE — 3008F BODY MASS INDEX DOCD: CPT | Mod: CPTII,S$GLB,, | Performed by: PSYCHIATRY & NEUROLOGY

## 2022-11-21 PROCEDURE — 99999 PR PBB SHADOW E&M-EST. PATIENT-LVL III: ICD-10-PCS | Mod: PBBFAC,,, | Performed by: PSYCHIATRY & NEUROLOGY

## 2022-11-21 PROCEDURE — 3075F PR MOST RECENT SYSTOLIC BLOOD PRESS GE 130-139MM HG: ICD-10-PCS | Mod: CPTII,S$GLB,, | Performed by: PSYCHIATRY & NEUROLOGY

## 2022-11-21 PROCEDURE — 3079F DIAST BP 80-89 MM HG: CPT | Mod: CPTII,S$GLB,, | Performed by: PSYCHIATRY & NEUROLOGY

## 2022-11-21 PROCEDURE — 99214 PR OFFICE/OUTPT VISIT, EST, LEVL IV, 30-39 MIN: ICD-10-PCS | Mod: S$GLB,,, | Performed by: PSYCHIATRY & NEUROLOGY

## 2022-11-21 PROCEDURE — 1159F PR MEDICATION LIST DOCUMENTED IN MEDICAL RECORD: ICD-10-PCS | Mod: CPTII,S$GLB,, | Performed by: PSYCHIATRY & NEUROLOGY

## 2022-11-21 PROCEDURE — 3008F PR BODY MASS INDEX (BMI) DOCUMENTED: ICD-10-PCS | Mod: CPTII,S$GLB,, | Performed by: PSYCHIATRY & NEUROLOGY

## 2022-11-21 RX ORDER — LORAZEPAM 0.5 MG/1
0.5 TABLET ORAL DAILY PRN
COMMUNITY
Start: 2022-10-14

## 2022-11-21 RX ORDER — LEVOTHYROXINE SODIUM 112 UG/1
112 TABLET ORAL DAILY
COMMUNITY
Start: 2022-11-08

## 2022-11-21 RX ORDER — LANOLIN ALCOHOL/MO/W.PET/CERES
400 CREAM (GRAM) TOPICAL DAILY
COMMUNITY

## 2022-11-21 NOTE — PROGRESS NOTES
Subjective:       Patient ID: Jan Rosario is a 52 y.o. female.    Chief Complaint: Headache  INTERVAL HISTORY 11/21/2022  The patient presents for follow up. Since on Lamictal 100 mg and Magnesium 400 mg BID she has had no further auras. Reports occasional tension-type headache which respond to Ibuptofen. Anxious when around triggers. Has deferred brain MRI and ECHO with saline contrast since she has improved.     HPI   The patient is a pleasant 51 y/o female presenting with chief complaint of headache. PMHx of sleep difficulty and hypothyroidism. She has long history of migraine starting at the age of 24. She reports episodic attacks with remission during her 3 pregnancies. No previous history of menstrual worsening or OCP other than from age 24 until age 27. Not on hormonal replacement. She describes a change in her habitual pattern. Her headaches used to be extremely painful but now, it is the visual aura that is more of a problem along with severe photophobia. In the past she had tried Topamax, more recently, her PCP started her on AImovig 70 mg, when it lost efficacy, she was referred to Neurology. Aimovig increased to 140 mg. She experienced wearing off effect, duration of effect 3 weeks instead of 4 weeks. She was switched to Ajovy but it does not seem to be working well. All of her attacks are preceded by visual aura describes as scintillating scotomata, impaired speech and cognitive impairment, lasting 30 minutes to one hour. Since on Nurtec, aura lasted 40 minutes on Saturday and 30 minutes last Sunday. She also uses Sumatriptan 50 mg wit 2 Aleves for acute attacks. Triggers include glare from driving and computer use. Please see details of headache characteristics below.    Headache questionnaire    1. When did your Headaches start?    1994 - AGE 24      2. Where are your headaches located?   TEMPLES AND BASE OF SKULL      3. Your headache's characteristics:   Excruciating, Throbbing, Pounding,  Stabbing,     4. How long does the headache last?   HOURS TO DAYS      5. How often does the headache occur?   weekly      6. Are your headaches preceded or accompanied by other symptoms? yes   If yes, please describe.  VISUAL IMPAIRMENT, SPEECH & COGNITIVE IMPAIRMENT      7. Does the headache awaken you at night? no   If so, how often? NA        8. Please lillian the word that best describes your headache's intensity:    severe      9. Using a scale of 1 through 10, with 0 = no pain and 10 = the worst pain:   What score is your headache now? 0   What score is your headache at its worst? 10   What score is your headache at its best? 5        10. Possible associated headache symptoms:  [x]  Sensitivity to light  [x] Dizziness  [] Nasal or sinus pressure/ pain   [x] Sensitivity to noise  [] Vertigo  [] Problems with concentration  [x] Sensitivity to smells  [] Ringing in ears  [x] Problems with memory    [x] Blurred vision SPOTS  [] Irritability  [] Problems with task completion   [] Double vision  [] Anger  []  Problems with relaxation  [] Loss of appetite  [] Anxiety  [x] Neck tightness, Neck pain  [] Nausea   [] Nasal congestion  [] Vomiting         11. Headache improving factors:  [] Sleep  [] Heat  [x] Darkness  [] Ice  [x] Local pressure [] Menses (period)  [] Massage   [x] Medications:        12. Headache worsening factors:   [] Fatigue [] Sneezing  [] Changes in Weather  [x] Light [x] Bending Over [x] Stress  [] Noise [] Ovulation  [] Multiple Sclerosis Flare-Up  [x] Smells  [] Menses  [] Food   [] Coughing [] Alcohol      13. Number of caffeinated drinks per day: 1      14. Number of diet drinks per day:  0    Please Check any Medications or Procedures tried/failed for Headache    AED Neuromodulators  MAOIs  Ergot Alkaloids    Acetazolamide (Diamox) [] Phenelzine (Nardil) [] Dihydroergotamine (Migranal) []   Carbamazepine (Tegretol) [] Tranylcypromine (Parnate) [] Ergotamine (Ergomar) []   Gabapentin  (Neurontin) [] Antihistamine/Serotonergic  Triptans    Lacosamide (Vimpat) [] Cyproheptadine (Periactin) [] Almotriptan (Axert) []   Lamotrigine (Lamictal) [] Antihypertensives  Eletriptan (Relpax) []   Levatiracetam (Keppra) [] Atenolol (Tenormin) [] Frovatriptan (Frova) []   Oxcarbazepine (Trileptal) [] Bisoprostol (Zebeta) [] Naratriptan (Amerge) []   Phenobarbital [] Candesartan (Atacand) [] Rizatriptan (Maxalt) []     Nebivolol (Bystolic)  Sumatriptan (Imitrex) [x]   Levetiracetam (Keppra)  Cardeilol (Coreg) [] Zolmitriptan (Zomig) []   Phenytoin (Dilantin) [] Diltiazem (Cardizem) []     Pregabalin (Lyrica) [] Lisinopril (Prinivil, Zestril) [] Combo Abortives    Primidone (Mysoline) [] Metoprolol (Toprol) [] BC Powder []   Tiagabine (Gabatril) [] Nadolol (Corgard) [] Butalbital and Acetaminophen (Bupap) []   Topiramate (Topamax)  (Trokendi) [x] Nicardipine (Cardene) []     Vigabatrin (Sabril) [] Nimodipine (Nimotop) [] Butalbital, Acetaminophen, and caffeine (Fioricet) []   Valproic Acid (Depakote) (Divalproex Sodium) [] Propranolol (Inderal) []     Zonisamide (Zonegran) [] Telmisartan (Micardis) [] Butalbital, Aspirin, and caffeine (Fiorinal) []   Benzodiazepines  Timolol (Blocadren) []     Alprazolam (Xanax) [] Verapamil (Calan, Verelan) [] Butalbital, Caffeine, Acetaminophen, and Codeine (Fioricet with Codeine) []   Diazepam (Valium) [] NSAIDs      Lorazepam (Ativan) [x] Acetaminophen (Tylenol) []     Clonazepam (Klonopin) [] Acetylsalicylic Acid (Aspirin) [] Butalbital, Caffeine, Aspirin, and Codeine  (Fiorinal with Codeine) []   Antidepressants  Diclofenac (Cambia) []     Amitriptyline (Elavil) [] Ibuprofen (Motrin) []     Desipramine (Norpramin) [] Indomethacin (Indocin) [] Aspirin, Caffeine, and Acetaminophen (Excedrin) (Goodys) []   Doxepin (Sinequan) [] Ketoprofen (Orudis) []     Fluoxetine (Prozac) [] Ketorolac (Toradol) [] Acetaminophen, Dichloralphenazone, and Isometheptene (Midrin) []   Imipramine  (Tofranil) [] Naproxen (Anaprox) (Aleve) [x]     Nortriptyline (Pamelor) [] Meclofenamic Acid (Meclomen) []     Venlafaxine (Effexor) [] Meloxicam (Mobic) [] Procedures    Desvenlafazine (Pristiq) [] Monoclonals  Greater occipital nerve block []   Duloxetine (Cymbalta) [] Eptinezumab [] Cervical, Thoracic, Lumbar radiofrequency ablation []   Trazadone [] Erenumab-aooe (Aimovig) [x] Spenopalatine ganglion block []   Wellbutrin [] Galcanezumab (Emgality) [] Occipital neuro stimulation []   Protriptyline (Vivactil) [] Fremanazumab-vfrm (Ajovy) X Cervical, Thoracic, Lumbar, Caudal Epidural steroid injection []   Escitalopram (Lexapro) [] Other [] Sacroiliac joint steroid injection []   Celexa [] Memantine (Namenda) [] Transforaminal epidural steroid injection []   NURTEC X Botox [] Facet joint injections []     Baclofen (Lioresal) [] Cervical, Thoracic, Lumbar medial branch blocks []       Cefaly []       Gamma Core []       Iovera []       Transcranial Magnetic stimulation []                          Review of Systems   Constitutional: Negative for activity change, appetite change, fatigue and fever.   HENT: Negative for congestion, dental problem, hearing loss, sinus pressure, tinnitus, trouble swallowing and voice change.    Eyes: Positive for visual disturbance. Negative for photophobia, pain and redness.   Respiratory: Negative for cough, chest tightness and shortness of breath.    Cardiovascular: Negative for chest pain, palpitations and leg swelling.   Gastrointestinal: Negative for abdominal pain, blood in stool, nausea and vomiting.   Endocrine: Negative for cold intolerance and heat intolerance.   Genitourinary: Negative for difficulty urinating, frequency, menstrual problem and urgency.   Musculoskeletal: Negative for arthralgias, back pain, gait problem, joint swelling, myalgias, neck pain and neck stiffness.   Skin: Negative.    Neurological: Negative for dizziness, tremors, seizures, syncope, facial  asymmetry, speech difficulty, weakness, light-headedness, numbness and headaches.   Hematological: Negative for adenopathy. Does not bruise/bleed easily.   Psychiatric/Behavioral: Negative for agitation, behavioral problems, confusion, decreased concentration, self-injury, sleep disturbance and suicidal ideas. The patient is not nervous/anxious and is not hyperactive.            Past Medical History:   Diagnosis Date    Allergy     Breast mass     History of tuberculosis     Hypothyroidism     Insomnia     Migraines      Past Surgical History:   Procedure Laterality Date    APPENDECTOMY      BUNIONECTOMY      lymphectomy      MOUTH SURGERY      X2    TONSILLECTOMY      WISDOM TOOTH EXTRACTION       Family History   Problem Relation Age of Onset    Hypertension Mother     No Known Problems Father     Bipolar disorder Sister     No Known Problems Brother     No Known Problems Daughter     No Known Problems Son     No Known Problems Son      Social History     Socioeconomic History    Marital status:    Tobacco Use    Smoking status: Current Some Day Smoker     Types: Cigarettes    Smokeless tobacco: Never Used   Substance and Sexual Activity    Alcohol use: Yes     Comment: Occasionally    Drug use: No     Review of patient's allergies indicates:   Allergen Reactions    Penicillin g potassium Swelling    Latex Rash       Current Outpatient Medications   Medication Sig    lamoTRIgine (LAMICTAL) 100 MG tablet Take 1 tablet (100 mg total) by mouth once daily.    LORazepam (ATIVAN) 0.5 MG tablet Take 0.5 mg by mouth daily as needed.    magnesium oxide (MAG-OX) 400 mg (241.3 mg magnesium) tablet Take 400 mg by mouth once daily.    ondansetron (ZOFRAN-ODT) 4 MG TbDL DISSOLVE ONE TABLET BY MOUTH EVERY 12 HOURS AS NEEDED FOR NAUSEA DUE TO MIGRAINE    sumatriptan (IMITREX) 100 MG tablet TAKE ONE TABLET BY MOUTH AS NEEDED FOR HEADACHE    SYNTHROID 112 mcg tablet Take 112 mcg by mouth once daily.    ubrogepant (UBRELVY)  100 mg tablet Take 100 mg by mouth once as needed for Migraine.    LORazepam (ATIVAN) 1 MG tablet Take 0.5 tablets (0.5 mg total) by mouth every 12 (twelve) hours as needed for Anxiety (severe anxiety). (Patient not taking: Reported on 11/21/2022)    SYNTHROID 137 mcg Tab tablet Take 1 tablet (137 mcg total) by mouth before breakfast. (Patient not taking: Reported on 11/21/2022)     No current facility-administered medications for this visit.           Objective:      Vitals:    11/21/22 0918   BP: 131/85   Pulse: 75   Resp: 18     Body mass index is 18.77 kg/m².      Physical Exam    Constitutional:   She appears well-developed and well-nourished. She is well groomed. Leptosomic body habit    Neurological Exam:  General: well-developed, well-nourished, no distress  Mental status: Awake and alert  Speech language: No dysarthria or aphasia on conversation  Cranial nerves: Face symmetric  Motor: Moves all extremities well  Coordination: No ataxia. No tremor.       Review of Data:  Results for orders placed or performed during the hospital encounter of 04/24/20   CT Head Without Contrast    Narrative    EXAMINATION:  CT HEAD WITHOUT CONTRAST    CPT: 54186    CLINICAL HISTORY:  Headache, chronic, new features or neuro deficit;.    TECHNIQUE:  Axial CT slices through the head were obtained without the administration of contrast.  Sagittal and coronal reconstructions were performed.  Automated exposure control was utilized.  Total DLP is approximately 820.64 mGy cm.    COMPARISON:  None.    FINDINGS:  Mild generalized involutional changes are seen.  No evidence of acute intracranial hemorrhage, mass effect, midline deviation, hydrocephalus, or abnormal extra-axial fluid collection is visualized.  No evidence of acute large vessel territory ischemia/infarction is appreciated.  MRI with diffusion-weighted imaging is more sensitive in the assessment of acute ischemia/infarction.  The basilar cisterns are preserved. The  visualized paranasal sinuses and left mastoid air cells appear to be grossly clear.  Trace right mastoid effusion is noted.  No acute displaced calvarial fracture is visualized.      Impression    1. No acute intracranial abnormality is visualized.      Electronically signed by: Jose Olson MD  Date:    04/24/2020  Time:    14:00       Assessment and Plan   Intractable migraine with aura without status migrainosus. Given the fact that the patient experiences the visual aura with all attacks, I was planning on screening for PFO or ASD but given the excellent response to Lamotrigine, it was deferred.   For acute attack, continue Imitrex with Aleve and start Ubrelvy 100 mg as an alternative or in combination for severe headaches.   Other orders  -     ubrogepant (UBROGEPANT) 100 mg tablet; Take 1 tablet (100 mg total) by mouth once as needed for Migraine.  Dispense: 16 tablet; Refill: 2  -     sumatriptan (IMITREX) 50 MG tablet; Take 1 tablet (50 mg total) by mouth every 2 (two) hours as needed for Migraine.  Dispense: 9 tablet; Refill: 11  Adult hypothyroidism      Karime Woodall M.D  Medical Director, Headache and Facial Pain  [facility]

## 2023-01-04 ENCOUNTER — PATIENT MESSAGE (OUTPATIENT)
Dept: NEUROLOGY | Facility: CLINIC | Age: 53
End: 2023-01-04
Payer: COMMERCIAL

## 2023-02-20 ENCOUNTER — TELEPHONE (OUTPATIENT)
Dept: NEUROLOGY | Facility: CLINIC | Age: 53
End: 2023-02-20
Payer: COMMERCIAL

## 2023-02-20 RX ORDER — LAMOTRIGINE 150 MG/1
150 TABLET ORAL DAILY
Qty: 30 TABLET | Refills: 11 | Status: SHIPPED | OUTPATIENT
Start: 2023-02-20 | End: 2024-02-08

## 2023-02-20 NOTE — TELEPHONE ENCOUNTER
Called patient and she stated that she sees DR Woodall in March but she wanted to let DR Woodall know that she is having little spots in her vision along with the brain fog. She stated that she is taking the Lamotrigine 100mg once a day and not sure if DR Woodall would want to adjust medication. Patient also stated that she discussed with DR Woodall that she also has increased anxiety with the auras. Patient stated that DR Woodall told her that she would refill her Ativan if needed. Informed patient that this would probably be only a one time fill as this is not DR Woodall's area and that she would need to get this medication from who treats her anxiety. Explained to patient that DR Woodall has already left for the day and tomorrow is holiday so that she may not respond until Wednesday. Verbalized understanding.

## 2023-02-20 NOTE — TELEPHONE ENCOUNTER
----- Message from Rayne Ledesma sent at 2/20/2023 10:50 AM CST -----  Contact: self  Type:  Needs Medical Advice    Who Called: Pt     Would the patient rather a call back or a response via [facility]? call  Best Call Back Number: [phone]    Additional Information: Pt has had an increase in aura symptoms and increased brain fog... Please give pt a call to consult...    Thank you...

## 2023-02-20 NOTE — TELEPHONE ENCOUNTER
Called patient and notified that DR Woodall said to encourage that she take Magnesium for the auras and that she did increase her Lamitctal to 150mg a day and this was sent to the pharmacy. Also informed patient that DR Woodall did not respond to filling the Ativan. Verbalized understanding and stated that she will see us in March.

## 2023-03-20 ENCOUNTER — OFFICE VISIT (OUTPATIENT)
Dept: NEUROLOGY | Facility: CLINIC | Age: 53
End: 2023-03-20
Payer: COMMERCIAL

## 2023-03-20 VITALS
WEIGHT: 119.25 LBS | HEIGHT: 66 IN | DIASTOLIC BLOOD PRESSURE: 73 MMHG | SYSTOLIC BLOOD PRESSURE: 128 MMHG | RESPIRATION RATE: 17 BRPM | BODY MASS INDEX: 19.16 KG/M2 | HEART RATE: 81 BPM

## 2023-03-20 DIAGNOSIS — G43.119 INTRACTABLE MIGRAINE WITH AURA WITHOUT STATUS MIGRAINOSUS: Primary | ICD-10-CM

## 2023-03-20 DIAGNOSIS — E03.9 ADULT HYPOTHYROIDISM: ICD-10-CM

## 2023-03-20 PROCEDURE — 3008F PR BODY MASS INDEX (BMI) DOCUMENTED: ICD-10-PCS | Mod: CPTII,S$GLB,, | Performed by: PSYCHIATRY & NEUROLOGY

## 2023-03-20 PROCEDURE — 3074F SYST BP LT 130 MM HG: CPT | Mod: CPTII,S$GLB,, | Performed by: PSYCHIATRY & NEUROLOGY

## 2023-03-20 PROCEDURE — 3078F DIAST BP <80 MM HG: CPT | Mod: CPTII,S$GLB,, | Performed by: PSYCHIATRY & NEUROLOGY

## 2023-03-20 PROCEDURE — 1159F MED LIST DOCD IN RCRD: CPT | Mod: CPTII,S$GLB,, | Performed by: PSYCHIATRY & NEUROLOGY

## 2023-03-20 PROCEDURE — 99214 OFFICE O/P EST MOD 30 MIN: CPT | Mod: S$GLB,,, | Performed by: PSYCHIATRY & NEUROLOGY

## 2023-03-20 PROCEDURE — 3008F BODY MASS INDEX DOCD: CPT | Mod: CPTII,S$GLB,, | Performed by: PSYCHIATRY & NEUROLOGY

## 2023-03-20 PROCEDURE — 3074F PR MOST RECENT SYSTOLIC BLOOD PRESSURE < 130 MM HG: ICD-10-PCS | Mod: CPTII,S$GLB,, | Performed by: PSYCHIATRY & NEUROLOGY

## 2023-03-20 PROCEDURE — 1159F PR MEDICATION LIST DOCUMENTED IN MEDICAL RECORD: ICD-10-PCS | Mod: CPTII,S$GLB,, | Performed by: PSYCHIATRY & NEUROLOGY

## 2023-03-20 PROCEDURE — 3078F PR MOST RECENT DIASTOLIC BLOOD PRESSURE < 80 MM HG: ICD-10-PCS | Mod: CPTII,S$GLB,, | Performed by: PSYCHIATRY & NEUROLOGY

## 2023-03-20 PROCEDURE — 99999 PR PBB SHADOW E&M-EST. PATIENT-LVL III: ICD-10-PCS | Mod: PBBFAC,,, | Performed by: PSYCHIATRY & NEUROLOGY

## 2023-03-20 PROCEDURE — 99214 PR OFFICE/OUTPT VISIT, EST, LEVL IV, 30-39 MIN: ICD-10-PCS | Mod: S$GLB,,, | Performed by: PSYCHIATRY & NEUROLOGY

## 2023-03-20 PROCEDURE — 99999 PR PBB SHADOW E&M-EST. PATIENT-LVL III: CPT | Mod: PBBFAC,,, | Performed by: PSYCHIATRY & NEUROLOGY

## 2023-03-20 NOTE — PROGRESS NOTES
Subjective:       Patient ID: Jan Rosario is a 52 y.o. female.    Chief Complaint: Headache    INTERVAL HISTORY 3/20/2023  The patient presents for follow up. She is doing quite well on Lamotrigine and Magnesium. No further auras. Very pleased with current protocol. Otherwise information below is still accurate and current.    INTERVAL HISTORY 11/21/2022  The patient presents for follow up. Since on Lamictal 100 mg and Magnesium 400 mg BID she has had no further auras. Reports occasional tension-type headache which respond to Ibuptofen. Anxious when around triggers. Has deferred brain MRI and ECHO with saline contrast since she has improved.     HPI   The patient is a pleasant 53 y/o female presenting with chief complaint of headache. PMHx of sleep difficulty and hypothyroidism. She has long history of migraine starting at the age of 24. She reports episodic attacks with remission during her 3 pregnancies. No previous history of menstrual worsening or OCP other than from age 24 until age 27. Not on hormonal replacement. She describes a change in her habitual pattern. Her headaches used to be extremely painful but now, it is the visual aura that is more of a problem along with severe photophobia. In the past she had tried Topamax, more recently, her PCP started her on AImovig 70 mg, when it lost efficacy, she was referred to Neurology. Aimovig increased to 140 mg. She experienced wearing off effect, duration of effect 3 weeks instead of 4 weeks. She was switched to Ajovy but it does not seem to be working well. All of her attacks are preceded by visual aura describes as scintillating scotomata, impaired speech and cognitive impairment, lasting 30 minutes to one hour. Since on Nurtec, aura lasted 40 minutes on Saturday and 30 minutes last Sunday. She also uses Sumatriptan 50 mg wit 2 Aleves for acute attacks. Triggers include glare from driving and computer use. Please see details of headache characteristics  below.    Headache questionnaire    1. When did your Headaches start?    1994 - AGE 24      2. Where are your headaches located?   TEMPLES AND BASE OF SKULL      3. Your headache's characteristics:   Excruciating, Throbbing, Pounding, Stabbing,     4. How long does the headache last?   HOURS TO DAYS      5. How often does the headache occur?   weekly      6. Are your headaches preceded or accompanied by other symptoms? yes   If yes, please describe.  VISUAL IMPAIRMENT, SPEECH & COGNITIVE IMPAIRMENT      7. Does the headache awaken you at night? no   If so, how often? NA        8. Please lillian the word that best describes your headache's intensity:    severe      9. Using a scale of 1 through 10, with 0 = no pain and 10 = the worst pain:   What score is your headache now? 0   What score is your headache at its worst? 10   What score is your headache at its best? 5        10. Possible associated headache symptoms:  [x]  Sensitivity to light  [x] Dizziness  [] Nasal or sinus pressure/ pain   [x] Sensitivity to noise  [] Vertigo  [] Problems with concentration  [x] Sensitivity to smells  [] Ringing in ears  [x] Problems with memory    [x] Blurred vision SPOTS  [] Irritability  [] Problems with task completion   [] Double vision  [] Anger  []  Problems with relaxation  [] Loss of appetite  [] Anxiety  [x] Neck tightness, Neck pain  [] Nausea   [] Nasal congestion  [] Vomiting         11. Headache improving factors:  [] Sleep  [] Heat  [x] Darkness  [] Ice  [x] Local pressure [] Menses (period)  [] Massage   [x] Medications:        12. Headache worsening factors:   [] Fatigue [] Sneezing  [] Changes in Weather  [x] Light [x] Bending Over [x] Stress  [] Noise [] Ovulation  [] Multiple Sclerosis Flare-Up  [x] Smells  [] Menses  [] Food   [] Coughing [] Alcohol      13. Number of caffeinated drinks per day: 1      14. Number of diet drinks per day:  0    Please Check any Medications or Procedures tried/failed for  Headache    AED Neuromodulators  MAOIs  Ergot Alkaloids    Acetazolamide (Diamox) [] Phenelzine (Nardil) [] Dihydroergotamine (Migranal) []   Carbamazepine (Tegretol) [] Tranylcypromine (Parnate) [] Ergotamine (Ergomar) []   Gabapentin (Neurontin) [] Antihistamine/Serotonergic  Triptans    Lacosamide (Vimpat) [] Cyproheptadine (Periactin) [] Almotriptan (Axert) []   Lamotrigine (Lamictal) [] Antihypertensives  Eletriptan (Relpax) []   Levatiracetam (Keppra) [] Atenolol (Tenormin) [] Frovatriptan (Frova) []   Oxcarbazepine (Trileptal) [] Bisoprostol (Zebeta) [] Naratriptan (Amerge) []   Phenobarbital [] Candesartan (Atacand) [] Rizatriptan (Maxalt) []     Nebivolol (Bystolic)  Sumatriptan (Imitrex) [x]   Levetiracetam (Keppra)  Cardeilol (Coreg) [] Zolmitriptan (Zomig) []   Phenytoin (Dilantin) [] Diltiazem (Cardizem) []     Pregabalin (Lyrica) [] Lisinopril (Prinivil, Zestril) [] Combo Abortives    Primidone (Mysoline) [] Metoprolol (Toprol) [] BC Powder []   Tiagabine (Gabatril) [] Nadolol (Corgard) [] Butalbital and Acetaminophen (Bupap) []   Topiramate (Topamax)  (Trokendi) [x] Nicardipine (Cardene) []     Vigabatrin (Sabril) [] Nimodipine (Nimotop) [] Butalbital, Acetaminophen, and caffeine (Fioricet) []   Valproic Acid (Depakote) (Divalproex Sodium) [] Propranolol (Inderal) []     Zonisamide (Zonegran) [] Telmisartan (Micardis) [] Butalbital, Aspirin, and caffeine (Fiorinal) []   Benzodiazepines  Timolol (Blocadren) []     Alprazolam (Xanax) [] Verapamil (Calan, Verelan) [] Butalbital, Caffeine, Acetaminophen, and Codeine (Fioricet with Codeine) []   Diazepam (Valium) [] NSAIDs      Lorazepam (Ativan) [x] Acetaminophen (Tylenol) []     Clonazepam (Klonopin) [] Acetylsalicylic Acid (Aspirin) [] Butalbital, Caffeine, Aspirin, and Codeine  (Fiorinal with Codeine) []   Antidepressants  Diclofenac (Cambia) []     Amitriptyline (Elavil) [] Ibuprofen (Motrin) []     Desipramine (Norpramin) [] Indomethacin (Indocin)  [] Aspirin, Caffeine, and Acetaminophen (Excedrin) (Goodys) []   Doxepin (Sinequan) [] Ketoprofen (Orudis) []     Fluoxetine (Prozac) [] Ketorolac (Toradol) [] Acetaminophen, Dichloralphenazone, and Isometheptene (Midrin) []   Imipramine (Tofranil) [] Naproxen (Anaprox) (Aleve) [x]     Nortriptyline (Pamelor) [] Meclofenamic Acid (Meclomen) []     Venlafaxine (Effexor) [] Meloxicam (Mobic) [] Procedures    Desvenlafazine (Pristiq) [] Monoclonals  Greater occipital nerve block []   Duloxetine (Cymbalta) [] Eptinezumab [] Cervical, Thoracic, Lumbar radiofrequency ablation []   Trazadone [] Erenumab-aooe (Aimovig) [x] Spenopalatine ganglion block []   Wellbutrin [] Galcanezumab (Emgality) [] Occipital neuro stimulation []   Protriptyline (Vivactil) [] Fremanazumab-vfrm (Ajovy) X Cervical, Thoracic, Lumbar, Caudal Epidural steroid injection []   Escitalopram (Lexapro) [] Other [] Sacroiliac joint steroid injection []   Celexa [] Memantine (Namenda) [] Transforaminal epidural steroid injection []   NURTEC X Botox [] Facet joint injections []     Baclofen (Lioresal) [] Cervical, Thoracic, Lumbar medial branch blocks []       Cefaly []       Gamma Core []       Iovera []       Transcranial Magnetic stimulation []                          Review of Systems   Constitutional: Negative for activity change, appetite change, fatigue and fever.   HENT: Negative for congestion, dental problem, hearing loss, sinus pressure, tinnitus, trouble swallowing and voice change.    Eyes: Positive for visual disturbance. Negative for photophobia, pain and redness.   Respiratory: Negative for cough, chest tightness and shortness of breath.    Cardiovascular: Negative for chest pain, palpitations and leg swelling.   Gastrointestinal: Negative for abdominal pain, blood in stool, nausea and vomiting.   Endocrine: Negative for cold intolerance and heat intolerance.   Genitourinary: Negative for difficulty urinating, frequency, menstrual problem  and urgency.   Musculoskeletal: Negative for arthralgias, back pain, gait problem, joint swelling, myalgias, neck pain and neck stiffness.   Skin: Negative.    Neurological: Negative for dizziness, tremors, seizures, syncope, facial asymmetry, speech difficulty, weakness, light-headedness, numbness and headaches.   Hematological: Negative for adenopathy. Does not bruise/bleed easily.   Psychiatric/Behavioral: Negative for agitation, behavioral problems, confusion, decreased concentration, self-injury, sleep disturbance and suicidal ideas. The patient is not nervous/anxious and is not hyperactive.            Past Medical History:   Diagnosis Date    Allergy     Breast mass     History of tuberculosis     Hypothyroidism     Insomnia     Migraines      Past Surgical History:   Procedure Laterality Date    APPENDECTOMY      BUNIONECTOMY      lymphectomy      MOUTH SURGERY      X2    TONSILLECTOMY      WISDOM TOOTH EXTRACTION       Family History   Problem Relation Age of Onset    Hypertension Mother     No Known Problems Father     Bipolar disorder Sister     No Known Problems Brother     No Known Problems Daughter     No Known Problems Son     No Known Problems Son      Social History     Socioeconomic History    Marital status:    Tobacco Use    Smoking status: Current Some Day Smoker     Types: Cigarettes    Smokeless tobacco: Never Used   Substance and Sexual Activity    Alcohol use: Yes     Comment: Occasionally    Drug use: No     Review of patient's allergies indicates:   Allergen Reactions    Penicillin g potassium Swelling    Latex Rash       Current Outpatient Medications   Medication Sig    lamoTRIgine (LAMICTAL) 100 MG tablet Take 1 tablet (100 mg total) by mouth once daily.    LORazepam (ATIVAN) 0.5 MG tablet Take 0.5 mg by mouth daily as needed.    magnesium oxide (MAG-OX) 400 mg (241.3 mg magnesium) tablet Take 400 mg by mouth once daily.    ondansetron (ZOFRAN-ODT) 4 MG TbDL DISSOLVE ONE TABLET BY  MOUTH EVERY 12 HOURS AS NEEDED FOR NAUSEA DUE TO MIGRAINE    sumatriptan (IMITREX) 100 MG tablet TAKE ONE TABLET BY MOUTH AS NEEDED FOR HEADACHE    SYNTHROID 112 mcg tablet Take 112 mcg by mouth once daily.    ubrogepant (UBRELVY) 100 mg tablet Take 100 mg by mouth once as needed for Migraine.    LORazepam (ATIVAN) 1 MG tablet Take 0.5 tablets (0.5 mg total) by mouth every 12 (twelve) hours as needed for Anxiety (severe anxiety). (Patient not taking: Reported on 11/21/2022)    SYNTHROID 137 mcg Tab tablet Take 1 tablet (137 mcg total) by mouth before breakfast. (Patient not taking: Reported on 11/21/2022)     No current facility-administered medications for this visit.           Objective:      Vitals:    03/20/23 0840   BP: 128/73   Pulse: 81   Resp: 17     Body mass index is 19.25 kg/m².    Physical Exam    Constitutional:   She appears well-developed and well-nourished. She is well groomed. Leptosomic body habit    Neurological Exam:  General: well-developed, well-nourished, no distress  Mental status: Awake and alert  Speech language: No dysarthria or aphasia on conversation  Cranial nerves: Face symmetric  Motor: Moves all extremities well  Coordination: No ataxia. No tremor.       Review of Data:  Results for orders placed or performed during the hospital encounter of 04/24/20   CT Head Without Contrast    Narrative    EXAMINATION:  CT HEAD WITHOUT CONTRAST    CPT: 53612    CLINICAL HISTORY:  Headache, chronic, new features or neuro deficit;.    TECHNIQUE:  Axial CT slices through the head were obtained without the administration of contrast.  Sagittal and coronal reconstructions were performed.  Automated exposure control was utilized.  Total DLP is approximately 820.64 mGy cm.    COMPARISON:  None.    FINDINGS:  Mild generalized involutional changes are seen.  No evidence of acute intracranial hemorrhage, mass effect, midline deviation, hydrocephalus, or abnormal extra-axial fluid collection is visualized.   No evidence of acute large vessel territory ischemia/infarction is appreciated.  MRI with diffusion-weighted imaging is more sensitive in the assessment of acute ischemia/infarction.  The basilar cisterns are preserved. The visualized paranasal sinuses and left mastoid air cells appear to be grossly clear.  Trace right mastoid effusion is noted.  No acute displaced calvarial fracture is visualized.      Impression    1. No acute intracranial abnormality is visualized.      Electronically signed by: Jose Olson MD  Date:    04/24/2020  Time:    14:00       Assessment and Plan   Intractable migraine with aura without status migrainosus. Given the fact that the patient experiences the visual aura with all attacks, I was planning on screening for PFO or ASD but given the excellent response to Lamotrigine, it was deferred.      Continue Lamotrigine, no further auras    For acute attack, continue Imitrex with Aleve and start Ubrelvy 100 mg as an alternative or in combination for severe headaches.     Other orders  -     ubrogepant (UBROGEPANT) 100 mg tablet; Take 1 tablet (100 mg total) by mouth once as needed for Migraine.  Dispense: 16 tablet; Refill: 2  -     sumatriptan (IMITREX) 50 MG tablet; Take 1 tablet (50 mg total) by mouth every 2 (two) hours as needed for Migraine.  Dispense: 9 tablet; Refill: 11  Adult hypothyroidism      Karime Woodall M.D  Medical Director, Headache and Facial Pain  [facility]

## 2023-04-25 ENCOUNTER — TELEPHONE (OUTPATIENT)
Dept: NEUROLOGY | Facility: CLINIC | Age: 53
End: 2023-04-25
Payer: COMMERCIAL

## 2023-04-25 NOTE — TELEPHONE ENCOUNTER
----- Message from Kiarra Flores, Patient Care Assistant sent at 4/25/2023  2:02 PM CDT -----  Regarding: advice  Contact: pt  Type: Needs Medical Advice  Who Called:  pt   Symptoms (please be specific):  migraines   Best Call Back Number: [phone] (home)     Additional Information: please call pt to advise. Thanks!

## 2023-04-25 NOTE — TELEPHONE ENCOUNTER
Called patient and she wanted DR Woodall to know that she has had 2 episodes of migraine. The first last week required no medication but today she had to take her medication. Informed patient on taking medication and follow ups. Explained that the migraines can go into remission but they will return at times. Verbalized understanding.

## 2024-02-08 RX ORDER — LAMOTRIGINE 150 MG/1
150 TABLET ORAL DAILY
Qty: 30 TABLET | Refills: 1 | Status: SHIPPED | OUTPATIENT
Start: 2024-02-08 | End: 2024-04-10 | Stop reason: SDUPTHER

## 2024-04-10 ENCOUNTER — OFFICE VISIT (OUTPATIENT)
Dept: NEUROLOGY | Facility: CLINIC | Age: 54
End: 2024-04-10
Payer: COMMERCIAL

## 2024-04-10 ENCOUNTER — TELEPHONE (OUTPATIENT)
Dept: NEUROLOGY | Facility: CLINIC | Age: 54
End: 2024-04-10
Payer: COMMERCIAL

## 2024-04-10 DIAGNOSIS — G43.119 INTRACTABLE MIGRAINE WITH AURA WITHOUT STATUS MIGRAINOSUS: Primary | ICD-10-CM

## 2024-04-10 DIAGNOSIS — E03.9 ADULT HYPOTHYROIDISM: ICD-10-CM

## 2024-04-10 PROCEDURE — 99214 OFFICE O/P EST MOD 30 MIN: CPT | Mod: 95,,, | Performed by: PSYCHIATRY & NEUROLOGY

## 2024-04-10 RX ORDER — LAMOTRIGINE 150 MG/1
150 TABLET ORAL DAILY
Qty: 90 TABLET | Refills: 3 | Status: SHIPPED | OUTPATIENT
Start: 2024-04-10 | End: 2024-04-15

## 2024-04-10 NOTE — PROGRESS NOTES
Subjective:       Patient ID: Jan Rosario is a 53 y.o. female.    Chief Complaint: Headache    INTERVAL HISTORY 4/10/2024  The patient location is: Home  The chief complaint leading to consultation is: Migraine  Visit type: Virtual visit with synchronous audio and video  Total time spent with patient: 15 minutes  The patient was informed of the relationship between the physician and patient and notified that he or she may decline to receive medical services by telemedicine and may withdraw from such care at any time.    The patient presents for follow up. She is doing quite well on Lamotrigine and Magnesium. No further auras. Has mot needed to use acute medications, she has Ubrelvy and Sumatriptan available just in case. Very pleased with current protocol. Otherwise information below is still accurate and current.    INTERVAL HISTORY 3/20/2023  The patient presents for follow up. She is doing quite well on Lamotrigine and Magnesium. No further auras. Very pleased with current protocol. Otherwise information below is still accurate and current.    INTERVAL HISTORY 11/21/2022  The patient presents for follow up. Since on Lamictal 100 mg and Magnesium 400 mg BID she has had no further auras. Reports occasional tension-type headache which respond to Ibuptofen. Anxious when around triggers. Has deferred brain MRI and ECHO with saline contrast since she has improved.     HPI   The patient is a pleasant 51 y/o female presenting with chief complaint of headache. PMHx of sleep difficulty and hypothyroidism. She has long history of migraine starting at the age of 24. She reports episodic attacks with remission during her 3 pregnancies. No previous history of menstrual worsening or OCP other than from age 24 until age 27. Not on hormonal replacement. She describes a change in her habitual pattern. Her headaches used to be extremely painful but now, it is the visual aura that is more of a problem along with severe  photophobia. In the past she had tried Topamax, more recently, her PCP started her on AImovig 70 mg, when it lost efficacy, she was referred to Neurology. Aimovig increased to 140 mg. She experienced wearing off effect, duration of effect 3 weeks instead of 4 weeks. She was switched to Ajovy but it does not seem to be working well. All of her attacks are preceded by visual aura describes as scintillating scotomata, impaired speech and cognitive impairment, lasting 30 minutes to one hour. Since on Nurtec, aura lasted 40 minutes on Saturday and 30 minutes last Sunday. She also uses Sumatriptan 50 mg wit 2 Aleves for acute attacks. Triggers include glare from driving and computer use. Please see details of headache characteristics below.    Headache questionnaire    1. When did your Headaches start?    1994 - AGE 24      2. Where are your headaches located?   TEMPLES AND BASE OF SKULL      3. Your headache's characteristics:   Excruciating, Throbbing, Pounding, Stabbing,     4. How long does the headache last?   HOURS TO DAYS      5. How often does the headache occur?   weekly      6. Are your headaches preceded or accompanied by other symptoms? yes   If yes, please describe.  VISUAL IMPAIRMENT, SPEECH & COGNITIVE IMPAIRMENT      7. Does the headache awaken you at night? no   If so, how often? NA        8. Please lillian the word that best describes your headache's intensity:    severe      9. Using a scale of 1 through 10, with 0 = no pain and 10 = the worst pain:   What score is your headache now? 0   What score is your headache at its worst? 10   What score is your headache at its best? 5        10. Possible associated headache symptoms:  [x]  Sensitivity to light  [x] Dizziness  [] Nasal or sinus pressure/ pain   [x] Sensitivity to noise  [] Vertigo  [] Problems with concentration  [x] Sensitivity to smells  [] Ringing in ears  [x] Problems with memory    [x] Blurred vision SPOTS  [] Irritability  [] Problems with  task completion   [] Double vision  [] Anger  []  Problems with relaxation  [] Loss of appetite  [] Anxiety  [x] Neck tightness, Neck pain  [] Nausea   [] Nasal congestion  [] Vomiting         11. Headache improving factors:  [] Sleep  [] Heat  [x] Darkness  [] Ice  [x] Local pressure [] Menses (period)  [] Massage   [x] Medications:        12. Headache worsening factors:   [] Fatigue [] Sneezing  [] Changes in Weather  [x] Light [x] Bending Over [x] Stress  [] Noise [] Ovulation  [] Multiple Sclerosis Flare-Up  [x] Smells  [] Menses  [] Food   [] Coughing [] Alcohol      13. Number of caffeinated drinks per day: 1      14. Number of diet drinks per day:  0    Please Check any Medications or Procedures tried/failed for Headache    AED Neuromodulators  MAOIs  Ergot Alkaloids    Acetazolamide (Diamox) [] Phenelzine (Nardil) [] Dihydroergotamine (Migranal) []   Carbamazepine (Tegretol) [] Tranylcypromine (Parnate) [] Ergotamine (Ergomar) []   Gabapentin (Neurontin) [] Antihistamine/Serotonergic  Triptans    Lacosamide (Vimpat) [] Cyproheptadine (Periactin) [] Almotriptan (Axert) []   Lamotrigine (Lamictal) [] Antihypertensives  Eletriptan (Relpax) []   Levatiracetam (Keppra) [] Atenolol (Tenormin) [] Frovatriptan (Frova) []   Oxcarbazepine (Trileptal) [] Bisoprostol (Zebeta) [] Naratriptan (Amerge) []   Phenobarbital [] Candesartan (Atacand) [] Rizatriptan (Maxalt) []     Nebivolol (Bystolic)  Sumatriptan (Imitrex) [x]   Levetiracetam (Keppra)  Cardeilol (Coreg) [] Zolmitriptan (Zomig) []   Phenytoin (Dilantin) [] Diltiazem (Cardizem) []     Pregabalin (Lyrica) [] Lisinopril (Prinivil, Zestril) [] Combo Abortives    Primidone (Mysoline) [] Metoprolol (Toprol) [] BC Powder []   Tiagabine (Gabatril) [] Nadolol (Corgard) [] Butalbital and Acetaminophen (Bupap) []   Topiramate (Topamax)  (Trokendi) [x] Nicardipine (Cardene) []     Vigabatrin (Sabril) [] Nimodipine (Nimotop) [] Butalbital, Acetaminophen, and caffeine  (Fioricet) []   Valproic Acid (Depakote) (Divalproex Sodium) [] Propranolol (Inderal) []     Zonisamide (Zonegran) [] Telmisartan (Micardis) [] Butalbital, Aspirin, and caffeine (Fiorinal) []   Benzodiazepines  Timolol (Blocadren) []     Alprazolam (Xanax) [] Verapamil (Calan, Verelan) [] Butalbital, Caffeine, Acetaminophen, and Codeine (Fioricet with Codeine) []   Diazepam (Valium) [] NSAIDs      Lorazepam (Ativan) [x] Acetaminophen (Tylenol) []     Clonazepam (Klonopin) [] Acetylsalicylic Acid (Aspirin) [] Butalbital, Caffeine, Aspirin, and Codeine  (Fiorinal with Codeine) []   Antidepressants  Diclofenac (Cambia) []     Amitriptyline (Elavil) [] Ibuprofen (Motrin) []     Desipramine (Norpramin) [] Indomethacin (Indocin) [] Aspirin, Caffeine, and Acetaminophen (Excedrin) (Goodys) []   Doxepin (Sinequan) [] Ketoprofen (Orudis) []     Fluoxetine (Prozac) [] Ketorolac (Toradol) [] Acetaminophen, Dichloralphenazone, and Isometheptene (Midrin) []   Imipramine (Tofranil) [] Naproxen (Anaprox) (Aleve) [x]     Nortriptyline (Pamelor) [] Meclofenamic Acid (Meclomen) []     Venlafaxine (Effexor) [] Meloxicam (Mobic) [] Procedures    Desvenlafazine (Pristiq) [] Monoclonals  Greater occipital nerve block []   Duloxetine (Cymbalta) [] Eptinezumab [] Cervical, Thoracic, Lumbar radiofrequency ablation []   Trazadone [] Erenumab-aooe (Aimovig) [x] Spenopalatine ganglion block []   Wellbutrin [] Galcanezumab (Emgality) [] Occipital neuro stimulation []   Protriptyline (Vivactil) [] Fremanazumab-vfrm (Ajovy) X Cervical, Thoracic, Lumbar, Caudal Epidural steroid injection []   Escitalopram (Lexapro) [] Other [] Sacroiliac joint steroid injection []   Celexa [] Memantine (Namenda) [] Transforaminal epidural steroid injection []   NURTEC X Botox [] Facet joint injections []     Baclofen (Lioresal) [] Cervical, Thoracic, Lumbar medial branch blocks []       Cefaly []       Gamma Core []       Iovera []       Transcranial Magnetic  stimulation []                          Review of Systems   Constitutional: Negative for activity change, appetite change, fatigue and fever.   HENT: Negative for congestion, dental problem, hearing loss, sinus pressure, tinnitus, trouble swallowing and voice change.    Eyes: Positive for visual disturbance. Negative for photophobia, pain and redness.   Respiratory: Negative for cough, chest tightness and shortness of breath.    Cardiovascular: Negative for chest pain, palpitations and leg swelling.   Gastrointestinal: Negative for abdominal pain, blood in stool, nausea and vomiting.   Endocrine: Negative for cold intolerance and heat intolerance.   Genitourinary: Negative for difficulty urinating, frequency, menstrual problem and urgency.   Musculoskeletal: Negative for arthralgias, back pain, gait problem, joint swelling, myalgias, neck pain and neck stiffness.   Skin: Negative.    Neurological: Negative for dizziness, tremors, seizures, syncope, facial asymmetry, speech difficulty, weakness, light-headedness, numbness and headaches.   Hematological: Negative for adenopathy. Does not bruise/bleed easily.   Psychiatric/Behavioral: Negative for agitation, behavioral problems, confusion, decreased concentration, self-injury, sleep disturbance and suicidal ideas. The patient is not nervous/anxious and is not hyperactive.            Past Medical History:   Diagnosis Date    Allergy     Breast mass     History of tuberculosis     Hypothyroidism     Insomnia     Migraines      Past Surgical History:   Procedure Laterality Date    APPENDECTOMY      BUNIONECTOMY      lymphectomy      MOUTH SURGERY      X2    TONSILLECTOMY      WISDOM TOOTH EXTRACTION       Family History   Problem Relation Age of Onset    Hypertension Mother     No Known Problems Father     Bipolar disorder Sister     No Known Problems Brother     No Known Problems Daughter     No Known Problems Son     No Known Problems Son      Social History      Socioeconomic History    Marital status:    Tobacco Use    Smoking status: Current Some Day Smoker     Types: Cigarettes    Smokeless tobacco: Never Used   Substance and Sexual Activity    Alcohol use: Yes     Comment: Occasionally    Drug use: No     Review of patient's allergies indicates:   Allergen Reactions    Penicillin g potassium Swelling    Latex Rash       Current Outpatient Medications   Medication Sig    lamoTRIgine (LAMICTAL) 100 MG tablet Take 1 tablet (100 mg total) by mouth once daily.    LORazepam (ATIVAN) 0.5 MG tablet Take 0.5 mg by mouth daily as needed.    magnesium oxide (MAG-OX) 400 mg (241.3 mg magnesium) tablet Take 400 mg by mouth once daily.    ondansetron (ZOFRAN-ODT) 4 MG TbDL DISSOLVE ONE TABLET BY MOUTH EVERY 12 HOURS AS NEEDED FOR NAUSEA DUE TO MIGRAINE    sumatriptan (IMITREX) 100 MG tablet TAKE ONE TABLET BY MOUTH AS NEEDED FOR HEADACHE    SYNTHROID 112 mcg tablet Take 112 mcg by mouth once daily.    ubrogepant (UBRELVY) 100 mg tablet Take 100 mg by mouth once as needed for Migraine.    LORazepam (ATIVAN) 1 MG tablet Take 0.5 tablets (0.5 mg total) by mouth every 12 (twelve) hours as needed for Anxiety (severe anxiety). (Patient not taking: Reported on 11/21/2022)    SYNTHROID 137 mcg Tab tablet Take 1 tablet (137 mcg total) by mouth before breakfast. (Patient not taking: Reported on 11/21/2022)     No current facility-administered medications for this visit.           Objective:        Physical Exam    Constitutional:   She appears well-developed and well-nourished. She is well groomed. Leptosomic body habit    Neurological Exam:  General: well-developed, well-nourished, no distress  Mental status: Awake and alert  Speech language: No dysarthria or aphasia on conversation  Cranial nerves: Face symmetric  Motor: Moves all extremities well  Coordination: No ataxia. No tremor.       Review of Data:  Results for orders placed or performed during the hospital encounter of  04/24/20   CT Head Without Contrast    Narrative    EXAMINATION:  CT HEAD WITHOUT CONTRAST    CPT: 32543    CLINICAL HISTORY:  Headache, chronic, new features or neuro deficit;.    TECHNIQUE:  Axial CT slices through the head were obtained without the administration of contrast.  Sagittal and coronal reconstructions were performed.  Automated exposure control was utilized.  Total DLP is approximately 820.64 mGy cm.    COMPARISON:  None.    FINDINGS:  Mild generalized involutional changes are seen.  No evidence of acute intracranial hemorrhage, mass effect, midline deviation, hydrocephalus, or abnormal extra-axial fluid collection is visualized.  No evidence of acute large vessel territory ischemia/infarction is appreciated.  MRI with diffusion-weighted imaging is more sensitive in the assessment of acute ischemia/infarction.  The basilar cisterns are preserved. The visualized paranasal sinuses and left mastoid air cells appear to be grossly clear.  Trace right mastoid effusion is noted.  No acute displaced calvarial fracture is visualized.      Impression    1. No acute intracranial abnormality is visualized.      Electronically signed by: Jose Olson MD  Date:    04/24/2020  Time:    14:00       Assessment and Plan   Intractable migraine with aura without status migrainosus. Given the fact that the patient experiences the visual aura with all attacks, I was planning on screening for PFO or ASD but given the excellent response to Lamotrigine, it was deferred.      Continue Lamotrigine, no further auras    For acute attack, continue Imitrex with Aleve and start Ubrelvy 100 mg as an alternative or in combination for severe headaches.     Other orders  -     ubrogepant (UBROGEPANT) 100 mg tablet; Take 1 tablet (100 mg total) by mouth once as needed for Migraine.  Dispense: 16 tablet; Refill: 2  -     sumatriptan (IMITREX) 50 MG tablet; Take 1 tablet (50 mg total) by mouth every 2 (two) hours as needed for Migraine.   Dispense: 9 tablet; Refill: 11  Adult hypothyroidism      Karime Woodall M.D  Medical Director, Headache and Facial Pain  [facility]

## 2024-04-10 NOTE — TELEPHONE ENCOUNTER
----- Message from Shahana Reese Patient Care Assistant sent at 4/10/2024  8:28 AM CDT -----  Contact: Pt  Type: Same Day Appointment    Caller is requesting a same day appointment.  Caller declined first available appt listed below.    Name of caller:Pt  When is the first available appt:No avail appts  Symptoms:Headaches Follow Up as VV due to weather  Best Call back number:[phone]  Additional Info:Please advise-Thank you~

## 2024-04-15 RX ORDER — LAMOTRIGINE 150 MG/1
150 TABLET ORAL DAILY
Qty: 30 TABLET | Refills: 1 | Status: SHIPPED | OUTPATIENT
Start: 2024-04-15 | End: 2025-04-15

## 2024-11-08 ENCOUNTER — TELEPHONE (OUTPATIENT)
Dept: NEUROLOGY | Facility: CLINIC | Age: 54
End: 2024-11-08
Payer: COMMERCIAL

## 2024-11-08 NOTE — TELEPHONE ENCOUNTER
----- Message from Natalie sent at 11/7/2024  8:52 AM CST -----  Regarding: Sooner Appointment Request  Contact: patient at [phone]  Type:  Sooner Appointment Request    Caller is requesting a sooner appointment.  Caller declined first available appointment listed below.  Caller will not accept being placed on the waitlist and is requesting a message be sent to doctor.    Name of Caller:  patient at [phone]    When is the first available appointment?  12/17  Symptoms:  headache, follow up  Additional Information:  Please call and advise. Thank you

## 2024-11-08 NOTE — TELEPHONE ENCOUNTER
----- Message from Flynn sent at 11/8/2024  9:44 AM CST -----  Type: Needs Medical Advice    Who Called:  Pt    Best Call Back Number: [phone]    Additional Information: Pt returning call  Please call back to advise, Thanks!

## 2024-12-05 ENCOUNTER — OFFICE VISIT (OUTPATIENT)
Dept: NEUROLOGY | Facility: CLINIC | Age: 54
End: 2024-12-05
Payer: COMMERCIAL

## 2024-12-05 VITALS
TEMPERATURE: 97 F | WEIGHT: 115.44 LBS | HEIGHT: 66 IN | HEART RATE: 76 BPM | DIASTOLIC BLOOD PRESSURE: 81 MMHG | BODY MASS INDEX: 18.55 KG/M2 | SYSTOLIC BLOOD PRESSURE: 141 MMHG | RESPIRATION RATE: 17 BRPM

## 2024-12-05 DIAGNOSIS — G43.119 INTRACTABLE MIGRAINE WITH AURA WITHOUT STATUS MIGRAINOSUS: Primary | ICD-10-CM

## 2024-12-05 PROCEDURE — 3079F DIAST BP 80-89 MM HG: CPT | Mod: CPTII,S$GLB,, | Performed by: PSYCHIATRY & NEUROLOGY

## 2024-12-05 PROCEDURE — 99214 OFFICE O/P EST MOD 30 MIN: CPT | Mod: S$GLB,,, | Performed by: PSYCHIATRY & NEUROLOGY

## 2024-12-05 PROCEDURE — 99999 PR PBB SHADOW E&M-EST. PATIENT-LVL IV: CPT | Mod: PBBFAC,,, | Performed by: PSYCHIATRY & NEUROLOGY

## 2024-12-05 PROCEDURE — 1159F MED LIST DOCD IN RCRD: CPT | Mod: CPTII,S$GLB,, | Performed by: PSYCHIATRY & NEUROLOGY

## 2024-12-05 PROCEDURE — 3077F SYST BP >= 140 MM HG: CPT | Mod: CPTII,S$GLB,, | Performed by: PSYCHIATRY & NEUROLOGY

## 2024-12-05 PROCEDURE — 3008F BODY MASS INDEX DOCD: CPT | Mod: CPTII,S$GLB,, | Performed by: PSYCHIATRY & NEUROLOGY

## 2024-12-05 RX ORDER — IBUPROFEN 200 MG
200 TABLET ORAL EVERY 6 HOURS PRN
COMMUNITY

## 2024-12-06 NOTE — PROGRESS NOTES
"Subjective:       Patient ID: Jan Rosario is a 54 y.o. female.    Chief Complaint: Headache    INTERVAL HISTORY 12/05/2024  Ms. Montoya comes for follow up. She reports that a few symptoms have returned. Over the past 2 months, she has been getting "twinges of aura" that have not materialized in full auras but she is scared that they will. Ms. Montoya has identified glare and bright light as a trigger for visual symptoms. She is on Lamictal 150 mg for prevention and Ubrelvy and sumatriptan for acute attacks. Since most of them are mild to moderate, she uses Ibuprofen. No change in habitual headche pattern. No red flags.    INTERVAL HISTORY 4/10/2024  The patient location is: Home  The chief complaint leading to consultation is: Migraine  Visit type: Virtual visit with synchronous audio and video  Total time spent with patient: 15 minutes  The patient was informed of the relationship between the physician and patient and notified that he or she may decline to receive medical services by telemedicine and may withdraw from such care at any time.    The patient presents for follow up. She is doing quite well on Lamotrigine and Magnesium. No further auras. Has mot needed to use acute medications, she has Ubrelvy and Sumatriptan available just in case. Very pleased with current protocol. Otherwise information below is still accurate and current.    INTERVAL HISTORY 3/20/2023  The patient presents for follow up. She is doing quite well on Lamotrigine and Magnesium. No further auras. Very pleased with current protocol. Otherwise information below is still accurate and current.    INTERVAL HISTORY 11/21/2022  The patient presents for follow up. Since on Lamictal 100 mg and Magnesium 400 mg BID she has had no further auras. Reports occasional tension-type headache which respond to Ibuptofen. Anxious when around triggers. Has deferred brain MRI and ECHO with saline contrast since she has improved.     HPI   The patient is a " pleasant 53 y/o female presenting with chief complaint of headache. PMHx of sleep difficulty and hypothyroidism. She has long history of migraine starting at the age of 24. She reports episodic attacks with remission during her 3 pregnancies. No previous history of menstrual worsening or OCP other than from age 24 until age 27. Not on hormonal replacement. She describes a change in her habitual pattern. Her headaches used to be extremely painful but now, it is the visual aura that is more of a problem along with severe photophobia. In the past she had tried Topamax, more recently, her PCP started her on AImovig 70 mg, when it lost efficacy, she was referred to Neurology. Aimovig increased to 140 mg. She experienced wearing off effect, duration of effect 3 weeks instead of 4 weeks. She was switched to Ajovy but it does not seem to be working well. All of her attacks are preceded by visual aura describes as scintillating scotomata, impaired speech and cognitive impairment, lasting 30 minutes to one hour. Since on Nurtec, aura lasted 40 minutes on Saturday and 30 minutes last Sunday. She also uses Sumatriptan 50 mg wit 2 Aleves for acute attacks. Triggers include glare from driving and computer use. Please see details of headache characteristics below.    Headache questionnaire    1. When did your Headaches start?    1994 - AGE 24      2. Where are your headaches located?   TEMPLES AND BASE OF SKULL      3. Your headache's characteristics:   Excruciating, Throbbing, Pounding, Stabbing,     4. How long does the headache last?   HOURS TO DAYS      5. How often does the headache occur?   weekly      6. Are your headaches preceded or accompanied by other symptoms? yes   If yes, please describe.  VISUAL IMPAIRMENT, SPEECH & COGNITIVE IMPAIRMENT      7. Does the headache awaken you at night? no   If so, how often? NA        8. Please lillian the word that best describes your headache's intensity:    severe      9. Using a scale  of 1 through 10, with 0 = no pain and 10 = the worst pain:   What score is your headache now? 0   What score is your headache at its worst? 10   What score is your headache at its best? 5        10. Possible associated headache symptoms:  [x]  Sensitivity to light  [x] Dizziness  [] Nasal or sinus pressure/ pain   [x] Sensitivity to noise  [] Vertigo  [] Problems with concentration  [x] Sensitivity to smells  [] Ringing in ears  [x] Problems with memory    [x] Blurred vision SPOTS  [] Irritability  [] Problems with task completion   [] Double vision  [] Anger  []  Problems with relaxation  [] Loss of appetite  [] Anxiety  [x] Neck tightness, Neck pain  [] Nausea   [] Nasal congestion  [] Vomiting         11. Headache improving factors:  [] Sleep  [] Heat  [x] Darkness  [] Ice  [x] Local pressure [] Menses (period)  [] Massage   [x] Medications:        12. Headache worsening factors:   [] Fatigue [] Sneezing  [] Changes in Weather  [x] Light [x] Bending Over [x] Stress  [] Noise [] Ovulation  [] Multiple Sclerosis Flare-Up  [x] Smells  [] Menses  [] Food   [] Coughing [] Alcohol      13. Number of caffeinated drinks per day: 1      14. Number of diet drinks per day:  0    Please Check any Medications or Procedures tried/failed for Headache    AED Neuromodulators  MAOIs  Ergot Alkaloids    Acetazolamide (Diamox) [] Phenelzine (Nardil) [] Dihydroergotamine (Migranal) []   Carbamazepine (Tegretol) [] Tranylcypromine (Parnate) [] Ergotamine (Ergomar) []   Gabapentin (Neurontin) [] Antihistamine/Serotonergic  Triptans    Lacosamide (Vimpat) [] Cyproheptadine (Periactin) [] Almotriptan (Axert) []   Lamotrigine (Lamictal) [] Antihypertensives  Eletriptan (Relpax) []   Levatiracetam (Keppra) [] Atenolol (Tenormin) [] Frovatriptan (Frova) []   Oxcarbazepine (Trileptal) [] Bisoprostol (Zebeta) [] Naratriptan (Amerge) []   Phenobarbital [] Candesartan (Atacand) [] Rizatriptan (Maxalt) []     Nebivolol (Bystolic)  Sumatriptan  (Imitrex) [x]   Levetiracetam (Keppra)  Cardeilol (Coreg) [] Zolmitriptan (Zomig) []   Phenytoin (Dilantin) [] Diltiazem (Cardizem) []     Pregabalin (Lyrica) [] Lisinopril (Prinivil, Zestril) [] Combo Abortives    Primidone (Mysoline) [] Metoprolol (Toprol) [] BC Powder []   Tiagabine (Gabatril) [] Nadolol (Corgard) [] Butalbital and Acetaminophen (Bupap) []   Topiramate (Topamax)  (Trokendi) [x] Nicardipine (Cardene) []     Vigabatrin (Sabril) [] Nimodipine (Nimotop) [] Butalbital, Acetaminophen, and caffeine (Fioricet) []   Valproic Acid (Depakote) (Divalproex Sodium) [] Propranolol (Inderal) []     Zonisamide (Zonegran) [] Telmisartan (Micardis) [] Butalbital, Aspirin, and caffeine (Fiorinal) []   Benzodiazepines  Timolol (Blocadren) []     Alprazolam (Xanax) [] Verapamil (Calan, Verelan) [] Butalbital, Caffeine, Acetaminophen, and Codeine (Fioricet with Codeine) []   Diazepam (Valium) [] NSAIDs      Lorazepam (Ativan) [x] Acetaminophen (Tylenol) []     Clonazepam (Klonopin) [] Acetylsalicylic Acid (Aspirin) [] Butalbital, Caffeine, Aspirin, and Codeine  (Fiorinal with Codeine) []   Antidepressants  Diclofenac (Cambia) []     Amitriptyline (Elavil) [] Ibuprofen (Motrin) []     Desipramine (Norpramin) [] Indomethacin (Indocin) [] Aspirin, Caffeine, and Acetaminophen (Excedrin) (Goodys) []   Doxepin (Sinequan) [] Ketoprofen (Orudis) []     Fluoxetine (Prozac) [] Ketorolac (Toradol) [] Acetaminophen, Dichloralphenazone, and Isometheptene (Midrin) []   Imipramine (Tofranil) [] Naproxen (Anaprox) (Aleve) [x]     Nortriptyline (Pamelor) [] Meclofenamic Acid (Meclomen) []     Venlafaxine (Effexor) [] Meloxicam (Mobic) [] Procedures    Desvenlafazine (Pristiq) [] Monoclonals  Greater occipital nerve block []   Duloxetine (Cymbalta) [] Eptinezumab [] Cervical, Thoracic, Lumbar radiofrequency ablation []   Trazadone [] Erenumab-aooe (Aimovig) [x] Spenopalatine ganglion block []   Wellbutrin [] Galcanezumab (Emgality) []  Occipital neuro stimulation []   Protriptyline (Vivactil) [] Fremanazumab-vfrm (Ajovy) X Cervical, Thoracic, Lumbar, Caudal Epidural steroid injection []   Escitalopram (Lexapro) [] Other [] Sacroiliac joint steroid injection []   Celexa [] Memantine (Namenda) [] Transforaminal epidural steroid injection []   NURTEC X Botox [] Facet joint injections []     Baclofen (Lioresal) [] Cervical, Thoracic, Lumbar medial branch blocks []       Cefaly []       Gamma Core []       Iovera []       Transcranial Magnetic stimulation []                          Review of Systems   Constitutional: Negative for activity change, appetite change, fatigue and fever.   HENT: Negative for congestion, dental problem, hearing loss, sinus pressure, tinnitus, trouble swallowing and voice change.    Eyes: Positive for visual disturbance. Negative for photophobia, pain and redness.   Respiratory: Negative for cough, chest tightness and shortness of breath.    Cardiovascular: Negative for chest pain, palpitations and leg swelling.   Gastrointestinal: Negative for abdominal pain, blood in stool, nausea and vomiting.   Endocrine: Negative for cold intolerance and heat intolerance.   Genitourinary: Negative for difficulty urinating, frequency, menstrual problem and urgency.   Musculoskeletal: Negative for arthralgias, back pain, gait problem, joint swelling, myalgias, neck pain and neck stiffness.   Skin: Negative.    Neurological: Negative for dizziness, tremors, seizures, syncope, facial asymmetry, speech difficulty, weakness, light-headedness, numbness and headaches.   Hematological: Negative for adenopathy. Does not bruise/bleed easily.   Psychiatric/Behavioral: Negative for agitation, behavioral problems, confusion, decreased concentration, self-injury, sleep disturbance and suicidal ideas. The patient is not nervous/anxious and is not hyperactive.            Past Medical History:   Diagnosis Date    Allergy     Breast mass     History of  tuberculosis     Hypothyroidism     Insomnia     Migraines      Past Surgical History:   Procedure Laterality Date    APPENDECTOMY      BUNIONECTOMY      lymphectomy      MOUTH SURGERY      X2    TONSILLECTOMY      WISDOM TOOTH EXTRACTION       Family History   Problem Relation Age of Onset    Hypertension Mother     No Known Problems Father     Bipolar disorder Sister     No Known Problems Brother     No Known Problems Daughter     No Known Problems Son     No Known Problems Son      Social History     Socioeconomic History    Marital status:    Tobacco Use    Smoking status: Current Some Day Smoker     Types: Cigarettes    Smokeless tobacco: Never Used   Substance and Sexual Activity    Alcohol use: Yes     Comment: Occasionally    Drug use: No     Review of patient's allergies indicates:   Allergen Reactions    Penicillin g potassium Swelling    Latex Rash       Current Outpatient Medications   Medication Sig    lamoTRIgine (LAMICTAL) 100 MG tablet Take 1 tablet (100 mg total) by mouth once daily.    LORazepam (ATIVAN) 0.5 MG tablet Take 0.5 mg by mouth daily as needed.    magnesium oxide (MAG-OX) 400 mg (241.3 mg magnesium) tablet Take 400 mg by mouth once daily.    ondansetron (ZOFRAN-ODT) 4 MG TbDL DISSOLVE ONE TABLET BY MOUTH EVERY 12 HOURS AS NEEDED FOR NAUSEA DUE TO MIGRAINE    sumatriptan (IMITREX) 100 MG tablet TAKE ONE TABLET BY MOUTH AS NEEDED FOR HEADACHE    SYNTHROID 112 mcg tablet Take 112 mcg by mouth once daily.    ubrogepant (UBRELVY) 100 mg tablet Take 100 mg by mouth once as needed for Migraine.    LORazepam (ATIVAN) 1 MG tablet Take 0.5 tablets (0.5 mg total) by mouth every 12 (twelve) hours as needed for Anxiety (severe anxiety). (Patient not taking: Reported on 11/21/2022)    SYNTHROID 137 mcg Tab tablet Take 1 tablet (137 mcg total) by mouth before breakfast. (Patient not taking: Reported on 11/21/2022)     No current facility-administered medications for this visit.            Objective:        Physical Exam  Vitals:    12/05/24 0811   BP: (!) 141/81   Pulse: 76   Resp: 17   Temp: 97.3 °F (36.3 °C)     Body mass index is 18.63 kg/m².    Constitutional:   She appears well-developed and well-nourished. She is well groomed. Leptosomic body habit    Neurological Exam:  General: well-developed, well-nourished, no distress  Mental status: Awake and alert  Speech language: No dysarthria or aphasia on conversation  Cranial nerves: Face symmetric  Motor: Moves all extremities well  Coordination: No ataxia. No tremor.       Review of Data:  Results for orders placed or performed during the hospital encounter of 04/24/20   CT Head Without Contrast    Narrative    EXAMINATION:  CT HEAD WITHOUT CONTRAST    CPT: 01811    CLINICAL HISTORY:  Headache, chronic, new features or neuro deficit;.    TECHNIQUE:  Axial CT slices through the head were obtained without the administration of contrast.  Sagittal and coronal reconstructions were performed.  Automated exposure control was utilized.  Total DLP is approximately 820.64 mGy cm.    COMPARISON:  None.    FINDINGS:  Mild generalized involutional changes are seen.  No evidence of acute intracranial hemorrhage, mass effect, midline deviation, hydrocephalus, or abnormal extra-axial fluid collection is visualized.  No evidence of acute large vessel territory ischemia/infarction is appreciated.  MRI with diffusion-weighted imaging is more sensitive in the assessment of acute ischemia/infarction.  The basilar cisterns are preserved. The visualized paranasal sinuses and left mastoid air cells appear to be grossly clear.  Trace right mastoid effusion is noted.  No acute displaced calvarial fracture is visualized.      Impression    1. No acute intracranial abnormality is visualized.      Electronically signed by: Jose Olson MD  Date:    04/24/2020  Time:    14:00       Assessment and Plan   Intractable migraine with aura without status migrainosus.  "Given the fact that the patient experiences the visual aura with all attacks, I was planning on screening for PFO or ASD but given the excellent response to Lamotrigine, it was deferred.      Continue Lamotrigine, no further auras but "twinges" that could develop into auras. If this happens she is to increase Lamotrigine 25 mg for a few weeks until free of symptoms. Then can go back to regular dose of 150 mg daily    Increase Magnesium Oxide or Glycinate to 200 mg - 400 mg per day    Obtain FL 41 lenses to protect from glare which is a trigger    For acute attack, continue Imitrex with Aleve and start Ubrelvy 100 mg as an alternative or in combination for severe headaches.     Other orders  -     ubrogepant (UBROGEPANT) 100 mg tablet; Take 1 tablet (100 mg total) by mouth once as needed for Migraine.  Dispense: 16 tablet; Refill: 2  -     sumatriptan (IMITREX) 50 MG tablet; Take 1 tablet (50 mg total) by mouth every 2 (two) hours as needed for Migraine.  Dispense: 9 tablet; Refill: 11  Adult hypothyroidism      RTC in 3 months for virtual follow up    Karime Woodall M.D  Medical Director, Headache and Facial Pain  [facility]      "

## 2025-04-07 RX ORDER — LAMOTRIGINE 150 MG/1
150 TABLET ORAL DAILY
Qty: 90 TABLET | Refills: 3 | Status: SHIPPED | OUTPATIENT
Start: 2025-04-07 | End: 2026-04-07

## 2025-05-05 ENCOUNTER — OFFICE VISIT (OUTPATIENT)
Dept: NEUROLOGY | Facility: CLINIC | Age: 55
End: 2025-05-05
Payer: COMMERCIAL

## 2025-05-05 DIAGNOSIS — G43.119 INTRACTABLE MIGRAINE WITH AURA WITHOUT STATUS MIGRAINOSUS: Primary | ICD-10-CM

## 2025-05-05 DIAGNOSIS — E03.9 ADULT HYPOTHYROIDISM: ICD-10-CM

## 2025-05-05 PROCEDURE — 98006 SYNCH AUDIO-VIDEO EST MOD 30: CPT | Mod: 95,,, | Performed by: PSYCHIATRY & NEUROLOGY

## 2025-05-05 RX ORDER — LAMOTRIGINE 150 MG/1
150 TABLET ORAL DAILY
Qty: 90 TABLET | Refills: 3 | Status: SHIPPED | OUTPATIENT
Start: 2025-05-05 | End: 2026-05-05

## 2025-05-05 NOTE — PROGRESS NOTES
"Subjective:       Patient ID: Jan Rosario is a 54 y.o. female.    Chief Complaint: Headache    INTERVAL HISTORY 05/05/2025  The patient location is: Home  The chief complaint leading to consultation is: Migraine  Visit type: Virtual visit with synchronous audio and video  Total time spent with patient: 15 minutes  The patient was informed of the relationship between the physician and patient and notified that he or she may decline to receive medical services by telemedicine and may withdraw from such care at any time.    Ms. Montoya presents for follow up. She is doing quite well on 150 mg of Lamotrigine daily. No real need to take Ubrelvy or Imitrex. Watching triggers and lifestyle changes.  Jan has identified glare and bright light as a trigger for visual symptoms. She tried orange lenses but were cheap and did not work well for her.    INTERVAL HISTORY 12/05/2024  Ms. Montoya comes for follow up. She reports that a few symptoms have returned. Over the past 2 months, she has been getting "twinges of aura" that have not materialized in full auras but she is scared that they will. Ms. Montoya has identified glare and bright light as a trigger for visual symptoms. She is on Lamictal 150 mg for prevention and Ubrelvy and sumatriptan for acute attacks. Since most of them are mild to moderate, she uses Ibuprofen. No change in habitual headche pattern. No red flags.    INTERVAL HISTORY 4/10/2024  The patient location is: Home  The chief complaint leading to consultation is: Migraine  Visit type: Virtual visit with synchronous audio and video  Total time spent with patient: 15 minutes  The patient was informed of the relationship between the physician and patient and notified that he or she may decline to receive medical services by telemedicine and may withdraw from such care at any time.    The patient presents for follow up. She is doing quite well on Lamotrigine and Magnesium. No further auras. Has mot needed to use " acute medications, she has Ubrelvy and Sumatriptan available just in case. Very pleased with current protocol. Otherwise information below is still accurate and current.    INTERVAL HISTORY 3/20/2023  The patient presents for follow up. She is doing quite well on Lamotrigine and Magnesium. No further auras. Very pleased with current protocol. Otherwise information below is still accurate and current.    INTERVAL HISTORY 11/21/2022  The patient presents for follow up. Since on Lamictal 100 mg and Magnesium 400 mg BID she has had no further auras. Reports occasional tension-type headache which respond to Ibuptofen. Anxious when around triggers. Has deferred brain MRI and ECHO with saline contrast since she has improved.     HPI   The patient is a pleasant 53 y/o female presenting with chief complaint of headache. PMHx of sleep difficulty and hypothyroidism. She has long history of migraine starting at the age of 24. She reports episodic attacks with remission during her 3 pregnancies. No previous history of menstrual worsening or OCP other than from age 24 until age 27. Not on hormonal replacement. She describes a change in her habitual pattern. Her headaches used to be extremely painful but now, it is the visual aura that is more of a problem along with severe photophobia. In the past she had tried Topamax, more recently, her PCP started her on AImovig 70 mg, when it lost efficacy, she was referred to Neurology. Aimovig increased to 140 mg. She experienced wearing off effect, duration of effect 3 weeks instead of 4 weeks. She was switched to Ajovy but it does not seem to be working well. All of her attacks are preceded by visual aura describes as scintillating scotomata, impaired speech and cognitive impairment, lasting 30 minutes to one hour. Since on Nurtec, aura lasted 40 minutes on Saturday and 30 minutes last Sunday. She also uses Sumatriptan 50 mg wit 2 Aleves for acute attacks. Triggers include glare from  driving and computer use. Please see details of headache characteristics below.    Headache questionnaire    1. When did your Headaches start?    1994 - AGE 24      2. Where are your headaches located?   TEMPLES AND BASE OF SKULL      3. Your headache's characteristics:   Excruciating, Throbbing, Pounding, Stabbing,     4. How long does the headache last?   HOURS TO DAYS      5. How often does the headache occur?   weekly      6. Are your headaches preceded or accompanied by other symptoms? yes   If yes, please describe.  VISUAL IMPAIRMENT, SPEECH & COGNITIVE IMPAIRMENT      7. Does the headache awaken you at night? no   If so, how often? NA        8. Please lillian the word that best describes your headache's intensity:    severe      9. Using a scale of 1 through 10, with 0 = no pain and 10 = the worst pain:   What score is your headache now? 0   What score is your headache at its worst? 10   What score is your headache at its best? 5        10. Possible associated headache symptoms:  [x]  Sensitivity to light  [x] Dizziness  [] Nasal or sinus pressure/ pain   [x] Sensitivity to noise  [] Vertigo  [] Problems with concentration  [x] Sensitivity to smells  [] Ringing in ears  [x] Problems with memory    [x] Blurred vision SPOTS  [] Irritability  [] Problems with task completion   [] Double vision  [] Anger  []  Problems with relaxation  [] Loss of appetite  [] Anxiety  [x] Neck tightness, Neck pain  [] Nausea   [] Nasal congestion  [] Vomiting         11. Headache improving factors:  [] Sleep  [] Heat  [x] Darkness  [] Ice  [x] Local pressure [] Menses (period)  [] Massage   [x] Medications:        12. Headache worsening factors:   [] Fatigue [] Sneezing  [] Changes in Weather  [x] Light [x] Bending Over [x] Stress  [] Noise [] Ovulation  [] Multiple Sclerosis Flare-Up  [x] Smells  [] Menses  [] Food   [] Coughing [] Alcohol      13. Number of caffeinated drinks per day: 1      14. Number of diet drinks per day:   0    Please Check any Medications or Procedures tried/failed for Headache    AED Neuromodulators  MAOIs  Ergot Alkaloids    Acetazolamide (Diamox) [] Phenelzine (Nardil) [] Dihydroergotamine (Migranal) []   Carbamazepine (Tegretol) [] Tranylcypromine (Parnate) [] Ergotamine (Ergomar) []   Gabapentin (Neurontin) [] Antihistamine/Serotonergic  Triptans    Lacosamide (Vimpat) [] Cyproheptadine (Periactin) [] Almotriptan (Axert) []   Lamotrigine (Lamictal) [] Antihypertensives  Eletriptan (Relpax) []   Levatiracetam (Keppra) [] Atenolol (Tenormin) [] Frovatriptan (Frova) []   Oxcarbazepine (Trileptal) [] Bisoprostol (Zebeta) [] Naratriptan (Amerge) []   Phenobarbital [] Candesartan (Atacand) [] Rizatriptan (Maxalt) []     Nebivolol (Bystolic)  Sumatriptan (Imitrex) [x]   Levetiracetam (Keppra)  Cardeilol (Coreg) [] Zolmitriptan (Zomig) []   Phenytoin (Dilantin) [] Diltiazem (Cardizem) []     Pregabalin (Lyrica) [] Lisinopril (Prinivil, Zestril) [] Combo Abortives    Primidone (Mysoline) [] Metoprolol (Toprol) [] BC Powder []   Tiagabine (Gabatril) [] Nadolol (Corgard) [] Butalbital and Acetaminophen (Bupap) []   Topiramate (Topamax)  (Trokendi) [x] Nicardipine (Cardene) []     Vigabatrin (Sabril) [] Nimodipine (Nimotop) [] Butalbital, Acetaminophen, and caffeine (Fioricet) []   Valproic Acid (Depakote) (Divalproex Sodium) [] Propranolol (Inderal) []     Zonisamide (Zonegran) [] Telmisartan (Micardis) [] Butalbital, Aspirin, and caffeine (Fiorinal) []   Benzodiazepines  Timolol (Blocadren) []     Alprazolam (Xanax) [] Verapamil (Calan, Verelan) [] Butalbital, Caffeine, Acetaminophen, and Codeine (Fioricet with Codeine) []   Diazepam (Valium) [] NSAIDs      Lorazepam (Ativan) [x] Acetaminophen (Tylenol) []     Clonazepam (Klonopin) [] Acetylsalicylic Acid (Aspirin) [] Butalbital, Caffeine, Aspirin, and Codeine  (Fiorinal with Codeine) []   Antidepressants  Diclofenac (Cambia) []     Amitriptyline (Elavil) [] Ibuprofen  (Motrin) []     Desipramine (Norpramin) [] Indomethacin (Indocin) [] Aspirin, Caffeine, and Acetaminophen (Excedrin) (Goodys) []   Doxepin (Sinequan) [] Ketoprofen (Orudis) []     Fluoxetine (Prozac) [] Ketorolac (Toradol) [] Acetaminophen, Dichloralphenazone, and Isometheptene (Midrin) []   Imipramine (Tofranil) [] Naproxen (Anaprox) (Aleve) [x]     Nortriptyline (Pamelor) [] Meclofenamic Acid (Meclomen) []     Venlafaxine (Effexor) [] Meloxicam (Mobic) [] Procedures    Desvenlafazine (Pristiq) [] Monoclonals  Greater occipital nerve block []   Duloxetine (Cymbalta) [] Eptinezumab [] Cervical, Thoracic, Lumbar radiofrequency ablation []   Trazadone [] Erenumab-aooe (Aimovig) [x] Spenopalatine ganglion block []   Wellbutrin [] Galcanezumab (Emgality) [] Occipital neuro stimulation []   Protriptyline (Vivactil) [] Fremanazumab-vfrm (Ajovy) X Cervical, Thoracic, Lumbar, Caudal Epidural steroid injection []   Escitalopram (Lexapro) [] Other [] Sacroiliac joint steroid injection []   Celexa [] Memantine (Namenda) [] Transforaminal epidural steroid injection []   NURTEC X Botox [] Facet joint injections []     Baclofen (Lioresal) [] Cervical, Thoracic, Lumbar medial branch blocks []       Cefaly []       Gamma Core []       Iovera []       Transcranial Magnetic stimulation []                          Review of Systems   Constitutional: Negative for activity change, appetite change, fatigue and fever.   HENT: Negative for congestion, dental problem, hearing loss, sinus pressure, tinnitus, trouble swallowing and voice change.    Eyes: Positive for visual disturbance. Negative for photophobia, pain and redness.   Respiratory: Negative for cough, chest tightness and shortness of breath.    Cardiovascular: Negative for chest pain, palpitations and leg swelling.   Gastrointestinal: Negative for abdominal pain, blood in stool, nausea and vomiting.   Endocrine: Negative for cold intolerance and heat intolerance.   Genitourinary:  Negative for difficulty urinating, frequency, menstrual problem and urgency.   Musculoskeletal: Negative for arthralgias, back pain, gait problem, joint swelling, myalgias, neck pain and neck stiffness.   Skin: Negative.    Neurological: Negative for dizziness, tremors, seizures, syncope, facial asymmetry, speech difficulty, weakness, light-headedness, numbness and headaches.   Hematological: Negative for adenopathy. Does not bruise/bleed easily.   Psychiatric/Behavioral: Negative for agitation, behavioral problems, confusion, decreased concentration, self-injury, sleep disturbance and suicidal ideas. The patient is not nervous/anxious and is not hyperactive.            Past Medical History:   Diagnosis Date    Allergy     Breast mass     History of tuberculosis     Hypothyroidism     Insomnia     Migraines      Past Surgical History:   Procedure Laterality Date    APPENDECTOMY      BUNIONECTOMY      lymphectomy      MOUTH SURGERY      X2    TONSILLECTOMY      WISDOM TOOTH EXTRACTION       Family History   Problem Relation Age of Onset    Hypertension Mother     No Known Problems Father     Bipolar disorder Sister     No Known Problems Brother     No Known Problems Daughter     No Known Problems Son     No Known Problems Son      Social History     Socioeconomic History    Marital status:    Tobacco Use    Smoking status: Current Some Day Smoker     Types: Cigarettes    Smokeless tobacco: Never Used   Substance and Sexual Activity    Alcohol use: Yes     Comment: Occasionally    Drug use: No     Review of patient's allergies indicates:   Allergen Reactions    Penicillin g potassium Swelling    Latex Rash       Current Outpatient Medications   Medication Sig    lamoTRIgine (LAMICTAL) 100 MG tablet Take 1 tablet (100 mg total) by mouth once daily.    LORazepam (ATIVAN) 0.5 MG tablet Take 0.5 mg by mouth daily as needed.    magnesium oxide (MAG-OX) 400 mg (241.3 mg magnesium) tablet Take 400 mg by mouth once  daily.    ondansetron (ZOFRAN-ODT) 4 MG TbDL DISSOLVE ONE TABLET BY MOUTH EVERY 12 HOURS AS NEEDED FOR NAUSEA DUE TO MIGRAINE    sumatriptan (IMITREX) 100 MG tablet TAKE ONE TABLET BY MOUTH AS NEEDED FOR HEADACHE    SYNTHROID 112 mcg tablet Take 112 mcg by mouth once daily.    ubrogepant (UBRELVY) 100 mg tablet Take 100 mg by mouth once as needed for Migraine.    LORazepam (ATIVAN) 1 MG tablet Take 0.5 tablets (0.5 mg total) by mouth every 12 (twelve) hours as needed for Anxiety (severe anxiety). (Patient not taking: Reported on 11/21/2022)    SYNTHROID 137 mcg Tab tablet Take 1 tablet (137 mcg total) by mouth before breakfast. (Patient not taking: Reported on 11/21/2022)     No current facility-administered medications for this visit.           Objective:        Physical Exam    Constitutional:   She appears well-developed and well-nourished. She is well groomed. Leptosomic body habit    Neurological Exam:  General: well-developed, well-nourished, no distress  Mental status: Awake and alert  Speech language: No dysarthria or aphasia on conversation  Cranial nerves: Face symmetric  Motor: Moves all extremities well  Coordination: No ataxia. No tremor.       Review of Data:  Results for orders placed or performed during the hospital encounter of 04/24/20   CT Head Without Contrast    Narrative    EXAMINATION:  CT HEAD WITHOUT CONTRAST    CPT: 07622    CLINICAL HISTORY:  Headache, chronic, new features or neuro deficit;.    TECHNIQUE:  Axial CT slices through the head were obtained without the administration of contrast.  Sagittal and coronal reconstructions were performed.  Automated exposure control was utilized.  Total DLP is approximately 820.64 mGy cm.    COMPARISON:  None.    FINDINGS:  Mild generalized involutional changes are seen.  No evidence of acute intracranial hemorrhage, mass effect, midline deviation, hydrocephalus, or abnormal extra-axial fluid collection is visualized.  No evidence of acute large  vessel territory ischemia/infarction is appreciated.  MRI with diffusion-weighted imaging is more sensitive in the assessment of acute ischemia/infarction.  The basilar cisterns are preserved. The visualized paranasal sinuses and left mastoid air cells appear to be grossly clear.  Trace right mastoid effusion is noted.  No acute displaced calvarial fracture is visualized.      Impression    1. No acute intracranial abnormality is visualized.      Electronically signed by: Jose Olson MD  Date:    04/24/2020  Time:    14:00       Assessment and Plan   Intractable migraine with aura without status migrainosus. Given the fact that the patient experiences the visual aura with all attacks, I was planning on screening for PFO or ASD but given the excellent response to Lamotrigine, it was deferred.      Continue Lamotrigine, doing well, continue 150 mg dose    Increase Magnesium Oxide or Glycinate to 200 mg - 400 mg per day    Obtain FL 41 lenses to protect from glare which is a trigger    For acute attack, continue Imitrex with Aleve and start Ubrelvy 100 mg as an alternative or in combination for severe headaches.     Other orders  -     ubrogepant (UBROGEPANT) 100 mg tablet; Take 1 tablet (100 mg total) by mouth once as needed for Migraine.  Dispense: 16 tablet; Refill: 2  -     sumatriptan (IMITREX) 50 MG tablet; Take 1 tablet (50 mg total) by mouth every 2 (two) hours as needed for Migraine.  Dispense: 9 tablet; Refill: 11  Adult hypothyroidism      RTC in 3 months for virtual follow up    Karime Woodall M.D  Medical Director, Headache and Facial Pain  [facility]